# Patient Record
Sex: FEMALE | Race: WHITE | NOT HISPANIC OR LATINO | Employment: OTHER | URBAN - METROPOLITAN AREA
[De-identification: names, ages, dates, MRNs, and addresses within clinical notes are randomized per-mention and may not be internally consistent; named-entity substitution may affect disease eponyms.]

---

## 2023-03-24 DIAGNOSIS — E03.9 ACQUIRED HYPOTHYROIDISM: ICD-10-CM

## 2023-03-24 RX ORDER — LEVOTHYROXINE SODIUM 88 UG/1
88 TABLET ORAL DAILY
COMMUNITY
End: 2023-03-24 | Stop reason: SDUPTHER

## 2023-03-26 RX ORDER — LEVOTHYROXINE SODIUM 88 UG/1
88 TABLET ORAL DAILY
Qty: 90 TABLET | Refills: 1 | Status: SHIPPED | OUTPATIENT
Start: 2023-03-26 | End: 2023-10-09

## 2023-06-29 DIAGNOSIS — I10 ESSENTIAL (PRIMARY) HYPERTENSION: ICD-10-CM

## 2023-06-29 RX ORDER — ISOSORBIDE DINITRATE 10 MG/1
TABLET ORAL
Qty: 90 TABLET | Refills: 1 | Status: SHIPPED | OUTPATIENT
Start: 2023-06-29 | End: 2023-10-30 | Stop reason: SDUPTHER

## 2023-07-29 DIAGNOSIS — I10 ESSENTIAL (PRIMARY) HYPERTENSION: ICD-10-CM

## 2023-07-31 RX ORDER — AMLODIPINE BESYLATE 5 MG/1
5 TABLET ORAL DAILY
Qty: 90 TABLET | Refills: 1 | Status: SHIPPED | OUTPATIENT
Start: 2023-07-31 | End: 2023-10-30 | Stop reason: SDUPTHER

## 2023-09-27 DIAGNOSIS — E03.9 ACQUIRED HYPOTHYROIDISM: ICD-10-CM

## 2023-10-09 RX ORDER — LEVOTHYROXINE SODIUM 88 UG/1
TABLET ORAL
Qty: 30 TABLET | Refills: 0 | Status: SHIPPED | OUTPATIENT
Start: 2023-10-09 | End: 2023-10-27

## 2023-10-09 NOTE — TELEPHONE ENCOUNTER
Dr Giraldo pt    Pt son phoned office and stated pt needs refill on     Levothyroxine    [address]    Pt is sched for apt on 10/30/23

## 2023-10-30 ENCOUNTER — OFFICE VISIT (OUTPATIENT)
Dept: PRIMARY CARE | Facility: CLINIC | Age: 88
End: 2023-10-30
Payer: MEDICARE

## 2023-10-30 VITALS
OXYGEN SATURATION: 98 % | WEIGHT: 137.4 LBS | HEART RATE: 72 BPM | DIASTOLIC BLOOD PRESSURE: 88 MMHG | SYSTOLIC BLOOD PRESSURE: 140 MMHG | BODY MASS INDEX: 25.94 KG/M2 | RESPIRATION RATE: 15 BRPM | HEIGHT: 61 IN

## 2023-10-30 DIAGNOSIS — E03.9 ACQUIRED HYPOTHYROIDISM: ICD-10-CM

## 2023-10-30 DIAGNOSIS — Z71.89 ACP (ADVANCE CARE PLANNING): ICD-10-CM

## 2023-10-30 DIAGNOSIS — Z00.00 ENCOUNTER FOR MEDICARE ANNUAL WELLNESS EXAM: Primary | ICD-10-CM

## 2023-10-30 DIAGNOSIS — I10 ESSENTIAL (PRIMARY) HYPERTENSION: ICD-10-CM

## 2023-10-30 DIAGNOSIS — I10 HTN (HYPERTENSION), BENIGN: ICD-10-CM

## 2023-10-30 DIAGNOSIS — Z00.00 ROUTINE GENERAL MEDICAL EXAMINATION AT HEALTH CARE FACILITY: ICD-10-CM

## 2023-10-30 DIAGNOSIS — Z23 ENCOUNTER FOR IMMUNIZATION: ICD-10-CM

## 2023-10-30 DIAGNOSIS — E55.9 VITAMIN D DEFICIENCY: ICD-10-CM

## 2023-10-30 PROBLEM — K52.9 COLITIS: Status: ACTIVE | Noted: 2023-10-30

## 2023-10-30 PROBLEM — R41.3 MEMORY CHANGES: Status: ACTIVE | Noted: 2023-10-30

## 2023-10-30 PROCEDURE — 90662 IIV NO PRSV INCREASED AG IM: CPT | Performed by: FAMILY MEDICINE

## 2023-10-30 PROCEDURE — 90677 PCV20 VACCINE IM: CPT | Performed by: FAMILY MEDICINE

## 2023-10-30 PROCEDURE — G0439 PPPS, SUBSEQ VISIT: HCPCS | Performed by: FAMILY MEDICINE

## 2023-10-30 PROCEDURE — G0008 ADMIN INFLUENZA VIRUS VAC: HCPCS | Performed by: FAMILY MEDICINE

## 2023-10-30 PROCEDURE — G0009 ADMIN PNEUMOCOCCAL VACCINE: HCPCS | Performed by: FAMILY MEDICINE

## 2023-10-30 PROCEDURE — 99497 ADVNCD CARE PLAN 30 MIN: CPT | Performed by: FAMILY MEDICINE

## 2023-10-30 RX ORDER — AMLODIPINE BESYLATE 5 MG/1
5 TABLET ORAL DAILY
Qty: 90 TABLET | Refills: 1 | Status: SHIPPED | OUTPATIENT
Start: 2023-10-30 | End: 2024-01-02 | Stop reason: DRUGHIGH

## 2023-10-30 RX ORDER — LEVOTHYROXINE SODIUM 88 UG/1
TABLET ORAL
Qty: 90 TABLET | Refills: 0 | Status: SHIPPED | OUTPATIENT
Start: 2023-10-30 | End: 2024-01-25

## 2023-10-30 RX ORDER — MIRTAZAPINE 30 MG/1
30 TABLET, FILM COATED ORAL NIGHTLY
COMMUNITY
End: 2023-12-26

## 2023-10-30 RX ORDER — ISOSORBIDE DINITRATE 10 MG/1
10 TABLET ORAL DAILY
Qty: 90 TABLET | Refills: 1 | Status: SHIPPED | OUTPATIENT
Start: 2023-10-30 | End: 2023-12-26

## 2023-10-30 RX ORDER — OMEPRAZOLE 20 MG/1
TABLET, ORALLY DISINTEGRATING, DELAYED RELEASE ORAL
COMMUNITY
Start: 2022-09-27 | End: 2024-02-08 | Stop reason: SDUPTHER

## 2023-10-30 ASSESSMENT — ENCOUNTER SYMPTOMS
NERVOUS/ANXIOUS: 0
SORE THROAT: 0
RECTAL PAIN: 0
CONFUSION: 0
CONSTIPATION: 0
DIZZINESS: 0
SINUS PRESSURE: 0
BLOOD IN STOOL: 0
DECREASED CONCENTRATION: 0
FATIGUE: 0
COUGH: 0
AGITATION: 0
PALPITATIONS: 0
SLEEP DISTURBANCE: 0
EYE PAIN: 0
DYSURIA: 0
CONSTITUTIONAL NEGATIVE: 1
ADENOPATHY: 0
HEADACHES: 0
ACTIVITY CHANGE: 0
TROUBLE SWALLOWING: 0
COLOR CHANGE: 0
APPETITE CHANGE: 0
NECK STIFFNESS: 0
CHEST TIGHTNESS: 0
SHORTNESS OF BREATH: 0
PHOTOPHOBIA: 0
MYALGIAS: 0
DEPRESSION: 0
ABDOMINAL PAIN: 0
HEMATURIA: 0
SINUS PAIN: 0
LOSS OF SENSATION IN FEET: 0
OCCASIONAL FEELINGS OF UNSTEADINESS: 0
DYSPHORIC MOOD: 0
FEVER: 0
POLYPHAGIA: 0
STRIDOR: 0
SPEECH DIFFICULTY: 0
POLYDIPSIA: 0
FLANK PAIN: 0
EYE ITCHING: 1
DIARRHEA: 0
ABDOMINAL DISTENTION: 0
ARTHRALGIAS: 0
SEIZURES: 0
RHINORRHEA: 0

## 2023-10-30 ASSESSMENT — ACTIVITIES OF DAILY LIVING (ADL)
MANAGING_FINANCES: NEEDS ASSISTANCE
GROCERY_SHOPPING: NEEDS ASSISTANCE
TAKING_MEDICATION: NEEDS ASSISTANCE
DRESSING: INDEPENDENT
DOING_HOUSEWORK: NEEDS ASSISTANCE
BATHING: INDEPENDENT

## 2023-10-30 ASSESSMENT — PATIENT HEALTH QUESTIONNAIRE - PHQ9
2. FEELING DOWN, DEPRESSED OR HOPELESS: NOT AT ALL
1. LITTLE INTEREST OR PLEASURE IN DOING THINGS: NOT AT ALL
SUM OF ALL RESPONSES TO PHQ9 QUESTIONS 1 AND 2: 0

## 2023-10-30 NOTE — PROGRESS NOTES
Subjective   Reason for Visit: Lakeisha Moyer is an 88 y.o. female here for a Medicare Wellness visit.     Past Medical, Surgical, and Family History reviewed and updated in chart.    Reviewed all medications by prescribing practitioner or clinical pharmacist (such as prescriptions, OTCs, herbal therapies and supplements) and documented in the medical record.    Patient is here for medicare annual wellness visit.    Patient would like discuss influenza shot today.    Patient denies have any symptoms or concerns today.        Patient Care Team:  Duglas Giraldo DO as PCP - General  Duglas Giraldo DO as PCP - [facility] ACO Attributed Provider     Review of Systems   Constitutional: Negative.  Negative for activity change, appetite change, fatigue and fever.   HENT:  Negative for congestion, dental problem, ear discharge, ear pain, mouth sores, rhinorrhea, sinus pressure, sinus pain, sore throat, tinnitus and trouble swallowing.    Eyes:  Positive for itching. Negative for photophobia, pain and visual disturbance.   Respiratory:  Negative for cough, chest tightness, shortness of breath and stridor.    Cardiovascular:  Negative for chest pain and palpitations.   Gastrointestinal:  Negative for abdominal distention, abdominal pain, blood in stool, constipation, diarrhea and rectal pain.   Endocrine: Negative for cold intolerance, heat intolerance, polydipsia, polyphagia and polyuria.   Genitourinary:  Negative for dysuria, flank pain, hematuria and urgency.   Musculoskeletal:  Negative for arthralgias, gait problem, myalgias and neck stiffness.   Skin:  Negative for color change and rash.   Allergic/Immunologic: Negative for environmental allergies and food allergies.   Neurological:  Negative for dizziness, seizures, syncope, speech difficulty and headaches.   Hematological:  Negative for adenopathy.   Psychiatric/Behavioral:  Negative for agitation, confusion, decreased concentration, dysphoric mood and  "sleep disturbance. The patient is not nervous/anxious.        Objective   Vitals:  /88 (BP Location: Right arm, Patient Position: Sitting, BP Cuff Size: Adult)   Pulse 72   Resp 15   Ht 1.549 m (5' 1\")   Wt 62.3 kg (137 lb 6.4 oz)   SpO2 98%   BMI 25.96 kg/m²       Physical Exam  Vitals reviewed.   Constitutional:       General: She is not in acute distress.     Appearance: Normal appearance. She is normal weight. She is not ill-appearing or diaphoretic.   HENT:      Head: Normocephalic.      Right Ear: Tympanic membrane and external ear normal.      Left Ear: Tympanic membrane and external ear normal.      Nose: Nose normal. No congestion.      Mouth/Throat:      Pharynx: No posterior oropharyngeal erythema.   Eyes:      General:         Right eye: No discharge.         Left eye: No discharge.      Extraocular Movements: Extraocular movements intact.      Conjunctiva/sclera: Conjunctivae normal.      Pupils: Pupils are equal, round, and reactive to light.   Cardiovascular:      Rate and Rhythm: Normal rate and regular rhythm.      Pulses: Normal pulses.      Heart sounds: Normal heart sounds. No murmur heard.  Pulmonary:      Effort: Pulmonary effort is normal. No respiratory distress.      Breath sounds: Normal breath sounds. No wheezing or rales.   Chest:      Chest wall: No tenderness.   Abdominal:      General: Abdomen is flat. Bowel sounds are normal. There is no distension.      Palpations: There is no mass.      Tenderness: There is no abdominal tenderness. There is no guarding.   Musculoskeletal:         General: No tenderness. Normal range of motion.      Cervical back: Normal range of motion and neck supple. No tenderness.      Right lower leg: No edema.      Left lower leg: No edema.   Skin:     General: Skin is dry.      Coloration: Skin is not jaundiced.      Findings: No bruising, erythema or rash.   Neurological:      General: No focal deficit present.      Mental Status: She is alert and " oriented to person, place, and time. Mental status is at baseline.      Cranial Nerves: No cranial nerve deficit.      Sensory: No sensory deficit.      Coordination: Coordination normal.      Gait: Gait normal.   Psychiatric:         Mood and Affect: Mood normal.         Thought Content: Thought content normal.         Judgment: Judgment normal.         Assessment/Plan   Problem List Items Addressed This Visit       Hypothyroidism    Relevant Medications    levothyroxine (LevoxyL) 88 mcg tablet    HTN (hypertension), benign    Relevant Orders    Comprehensive Metabolic Panel    CBC and Auto Differential    Thyroid Stimulating Hormone     Other Visit Diagnoses       Encounter for Medicare annual wellness exam    -  Primary    Essential (primary) hypertension        Relevant Medications    isosorbide dinitrate (Isordil) 10 mg tablet    amLODIPine (Norvasc) 5 mg tablet    Encounter for immunization        Relevant Orders    Flu vaccine, quadrivalent, high-dose, preservative free, age 65y+ (FLUZONE)    Pneumococcal conjugate vaccine, 20-valent (PREVNAR 20)    ACP (advance care planning)        Vitamin D deficiency        Relevant Orders    Vitamin D 25-Hydroxy,Total (for eval of Vitamin D levels)              Scribe Attestation  By signing my name below, IMadison RMA, Scribe   attest that this documentation has been prepared under the direction and in the presence of Duglas Giraldo DO.   Provider Attestation - Scribe documentation    All medical record entries made by the Scribe were at my direction and personally dictated by me. I have reviewed the chart and agree that the record accurately reflects my personal performance of the history, physical exam, discussion and plan.

## 2023-12-26 DIAGNOSIS — I10 ESSENTIAL (PRIMARY) HYPERTENSION: ICD-10-CM

## 2023-12-26 DIAGNOSIS — R63.0 ANOREXIA: ICD-10-CM

## 2023-12-26 DIAGNOSIS — F43.9 REACTION TO SEVERE STRESS, UNSPECIFIED: ICD-10-CM

## 2023-12-26 RX ORDER — MIRTAZAPINE 30 MG/1
30 TABLET, FILM COATED ORAL NIGHTLY
Qty: 90 TABLET | Refills: 0 | Status: SHIPPED | OUTPATIENT
Start: 2023-12-26 | End: 2024-02-08 | Stop reason: SDUPTHER

## 2023-12-26 RX ORDER — CITALOPRAM 20 MG/1
20 TABLET, FILM COATED ORAL DAILY
Qty: 90 TABLET | Refills: 0 | Status: SHIPPED | OUTPATIENT
Start: 2023-12-26 | End: 2024-02-08 | Stop reason: SDUPTHER

## 2023-12-26 RX ORDER — ISOSORBIDE DINITRATE 10 MG/1
10 TABLET ORAL DAILY
Qty: 90 TABLET | Refills: 0 | Status: SHIPPED | OUTPATIENT
Start: 2023-12-26 | End: 2024-02-08 | Stop reason: SDUPTHER

## 2024-01-02 ENCOUNTER — TELEPHONE (OUTPATIENT)
Dept: PRIMARY CARE | Facility: CLINIC | Age: 89
End: 2024-01-02
Payer: MEDICARE

## 2024-01-02 ENCOUNTER — DOCUMENTATION (OUTPATIENT)
Dept: PRIMARY CARE | Facility: CLINIC | Age: 89
End: 2024-01-02
Payer: MEDICARE

## 2024-01-02 RX ORDER — DONEPEZIL HYDROCHLORIDE 5 MG/1
5 TABLET, FILM COATED ORAL NIGHTLY
COMMUNITY
End: 2024-02-08 | Stop reason: SDUPTHER

## 2024-01-02 RX ORDER — BUSPIRONE HYDROCHLORIDE 5 MG/1
5 TABLET ORAL 2 TIMES DAILY
COMMUNITY
End: 2024-01-04 | Stop reason: ALTCHOICE

## 2024-01-02 RX ORDER — QUETIAPINE FUMARATE 25 MG/1
12.5 TABLET, FILM COATED ORAL NIGHTLY
COMMUNITY
Start: 2023-12-29 | End: 2024-02-08 | Stop reason: SDUPTHER

## 2024-01-02 RX ORDER — AMLODIPINE BESYLATE 10 MG/1
10 TABLET ORAL DAILY
COMMUNITY
Start: 2023-12-29 | End: 2024-01-04 | Stop reason: ALTCHOICE

## 2024-01-02 NOTE — TELEPHONE ENCOUNTER
Son called     Was just discharged from [facility].   Would like to go over new medications and get clarification on what she is to discontinue and what she needs to be taking.  Scheduled hospital FUV today   
Statement Selected

## 2024-01-02 NOTE — PROGRESS NOTES
Discharge Facility: [facility]  Discharge Diagnosis: Intractable vomiting   Admission Date: 12/27/2023  Discharge Date: 12/29/2023    PCP Appointment Date: 1/4/2024  Specialist Appointment Date:   Hospital Encounter and Summary: Linked   See discharge assessment below for further details  Medications  Medications reviewed with patient/caregiver?: Yes (1/2/2024  4:22 PM)  Is the patient having any side effects they believe may be caused by any medication additions or changes?: No (1/2/2024  4:22 PM)  Does the patient have all medications ordered at discharge?: Yes (1/2/2024  4:22 PM)  Care Management Interventions: No intervention needed (1/2/2024  4:22 PM)  Prescription Comments: see med list (seroquel; mirtazapine; amlopidine) (1/2/2024  4:22 PM)  Is the patient taking all medications as directed (includes completed medication regime)?: Yes (1/2/2024  4:22 PM)  Care Management Interventions: Provided patient education (1/2/2024  4:22 PM)    Appointments  Does the patient have a primary care provider?: Yes (1/2/2024  4:22 PM)  Care Management Interventions: Verified appointment date/time/provider (1/2/2024  4:22 PM)  Has the patient kept scheduled appointments due by today?: Yes (1/2/2024  4:22 PM)  Care Management Interventions: Advised patient to keep appointment (1/2/2024  4:22 PM)    Self Management  What is the home health agency?: denies need-family supports (1/2/2024  4:22 PM)    Patient Teaching  Does the patient have access to their discharge instructions?: Yes (1/2/2024  4:22 PM)  Care Management Interventions: Reviewed instructions with patient (1/2/2024  4:22 PM)  What is the patient's perception of their health status since discharge?: Improving (1/2/2024  4:22 PM)  Is the patient/caregiver able to teach back the hierarchy of who to call/visit for symptoms/problems? PCP, Specialist, Home Health nurse, Urgent Care, ED, 911: Yes (1/2/2024  4:22 PM)  Patient/Caregiver Education Comments: Spoke to  patient's son, Keegan, patient states she is doing better but believes the whole incident was caused by her BP going too low. States he does her medicaitons and is her main caregiver but he has help from his daughter who is an RN as well. Medications discussed as well as follow up appts. (1/2/2024  4:22 PM)

## 2024-01-04 ENCOUNTER — OFFICE VISIT (OUTPATIENT)
Dept: PRIMARY CARE | Facility: CLINIC | Age: 89
End: 2024-01-04
Payer: MEDICARE

## 2024-01-04 VITALS
DIASTOLIC BLOOD PRESSURE: 50 MMHG | SYSTOLIC BLOOD PRESSURE: 90 MMHG | WEIGHT: 137 LBS | OXYGEN SATURATION: 96 % | BODY MASS INDEX: 25.86 KG/M2 | TEMPERATURE: 98 F | HEIGHT: 61 IN | HEART RATE: 74 BPM

## 2024-01-04 DIAGNOSIS — I95.89 HYPOTENSION DUE TO HYPOVOLEMIA: Primary | ICD-10-CM

## 2024-01-04 DIAGNOSIS — E03.9 ACQUIRED HYPOTHYROIDISM: ICD-10-CM

## 2024-01-04 DIAGNOSIS — R41.3 MEMORY CHANGES: ICD-10-CM

## 2024-01-04 DIAGNOSIS — F02.B4 MODERATE LATE ONSET ALZHEIMER'S DEMENTIA WITH ANXIETY (MULTI): ICD-10-CM

## 2024-01-04 DIAGNOSIS — E86.1 HYPOTENSION DUE TO HYPOVOLEMIA: Primary | ICD-10-CM

## 2024-01-04 DIAGNOSIS — I10 HTN (HYPERTENSION), BENIGN: ICD-10-CM

## 2024-01-04 DIAGNOSIS — Z09 ENCOUNTER FOR EXAMINATION FOLLOWING TREATMENT AT HOSPITAL: ICD-10-CM

## 2024-01-04 DIAGNOSIS — G30.1 MODERATE LATE ONSET ALZHEIMER'S DEMENTIA WITH ANXIETY (MULTI): ICD-10-CM

## 2024-01-04 DIAGNOSIS — E86.0 DEHYDRATION: ICD-10-CM

## 2024-01-04 PROCEDURE — 99496 TRANSJ CARE MGMT HIGH F2F 7D: CPT | Performed by: FAMILY MEDICINE

## 2024-01-04 ASSESSMENT — ENCOUNTER SYMPTOMS
FLANK PAIN: 0
PALPITATIONS: 0
AGITATION: 0
ADENOPATHY: 0
RECTAL PAIN: 0
POLYPHAGIA: 0
RHINORRHEA: 0
STRIDOR: 0
HEMATURIA: 0
SPEECH DIFFICULTY: 0
ABDOMINAL DISTENTION: 0
ACTIVITY CHANGE: 0
SORE THROAT: 0
POLYDIPSIA: 0
CONSTITUTIONAL NEGATIVE: 1
SLEEP DISTURBANCE: 0
SEIZURES: 0
DIARRHEA: 0
NECK STIFFNESS: 0
DECREASED CONCENTRATION: 0
PHOTOPHOBIA: 0
DIZZINESS: 0
COUGH: 0
CHEST TIGHTNESS: 0
BLOOD IN STOOL: 0
SINUS PAIN: 0
CONSTIPATION: 0
SINUS PRESSURE: 0
NERVOUS/ANXIOUS: 0
TROUBLE SWALLOWING: 0
ARTHRALGIAS: 0
EYE PAIN: 0
COLOR CHANGE: 0
FATIGUE: 0
FEVER: 0
DYSPHORIC MOOD: 0
CONFUSION: 0
APPETITE CHANGE: 0
HEADACHES: 0
SHORTNESS OF BREATH: 0
DYSURIA: 0
ABDOMINAL PAIN: 0
MYALGIAS: 0

## 2024-01-04 NOTE — PATIENT INSTRUCTIONS
Follow up in 3 months    Continue current medications and therapy for chronic medical conditions.    Patient was advised importance of proper diet/nutrition in addition adequate hydration. Patient was encouraged moderate exercise program to include 30 minutes daily for 5 days of the week or 150 minutes weekly. Patient will follow-up with us as scheduled.    STOP AMLODIPINE 10MG

## 2024-01-04 NOTE — PROGRESS NOTES
Subjective   Patient ID: Lakeisha Moyer is a 88 y.o. female who presents for Hospital Follow-up.    Patient was admitted to [facility] on 12/27/2023 for nausea and vomiting. Discharged 12/29/2023.   Patient had CT of abdomen brain and cervical spine with no acute findings.  She was mildly dehydrated and experiencing dementia symptoms.    Blood pressure medications were increased and medications for memory and mood were adjusted.  Amlodipine adjusted to 10 mg daily.  Mirtazapine adjusted to 30 mg 0.5 tablet daily.  Isosorbide was discontinued.  Seroquel 25 mg 0.5 tablet daily was started.           Review of Systems   Constitutional: Negative.  Negative for activity change, appetite change, fatigue and fever.   HENT:  Negative for congestion, dental problem, ear discharge, ear pain, mouth sores, rhinorrhea, sinus pressure, sinus pain, sore throat, tinnitus and trouble swallowing.    Eyes:  Negative for photophobia, pain and visual disturbance.   Respiratory:  Negative for cough, chest tightness, shortness of breath and stridor.    Cardiovascular:  Negative for chest pain and palpitations.   Gastrointestinal:  Negative for abdominal distention, abdominal pain, blood in stool, constipation, diarrhea and rectal pain.   Endocrine: Negative for cold intolerance, heat intolerance, polydipsia, polyphagia and polyuria.   Genitourinary:  Negative for dysuria, flank pain, hematuria and urgency.   Musculoskeletal:  Negative for arthralgias, gait problem, myalgias and neck stiffness.   Skin:  Negative for color change and rash.   Allergic/Immunologic: Negative for environmental allergies and food allergies.   Neurological:  Negative for dizziness, seizures, syncope, speech difficulty and headaches.   Hematological:  Negative for adenopathy.   Psychiatric/Behavioral:  Negative for agitation, confusion, decreased concentration, dysphoric mood and sleep disturbance. The patient is not nervous/anxious.   "      Objective   BP 90/50   Pulse 74   Temp 36.7 °C (98 °F)   Ht 1.549 m (5' 1\")   Wt 62.1 kg (137 lb)   SpO2 96%   BMI 25.89 kg/m²     Physical Exam  Vitals reviewed.   Constitutional:       General: She is not in acute distress.     Appearance: Normal appearance. She is normal weight. She is not ill-appearing or diaphoretic.   HENT:      Head: Normocephalic.      Right Ear: Tympanic membrane and external ear normal.      Left Ear: Tympanic membrane and external ear normal.      Nose: Nose normal. No congestion.      Mouth/Throat:      Pharynx: No posterior oropharyngeal erythema.   Eyes:      General:         Right eye: No discharge.         Left eye: No discharge.      Extraocular Movements: Extraocular movements intact.      Conjunctiva/sclera: Conjunctivae normal.      Pupils: Pupils are equal, round, and reactive to light.   Cardiovascular:      Rate and Rhythm: Normal rate and regular rhythm.      Pulses: Normal pulses.      Heart sounds: Normal heart sounds. No murmur heard.  Pulmonary:      Effort: Pulmonary effort is normal. No respiratory distress.      Breath sounds: Normal breath sounds. No wheezing or rales.   Chest:      Chest wall: No tenderness.   Abdominal:      General: Abdomen is flat. Bowel sounds are normal. There is no distension.      Palpations: There is no mass.      Tenderness: There is no abdominal tenderness. There is no guarding.   Musculoskeletal:         General: No tenderness. Normal range of motion.      Cervical back: Normal range of motion and neck supple. No tenderness.      Right lower leg: No edema.      Left lower leg: No edema.   Skin:     General: Skin is dry.      Coloration: Skin is not jaundiced.      Findings: No bruising, erythema or rash.   Neurological:      General: No focal deficit present.      Mental Status: She is alert. Mental status is at baseline. She is disoriented.      Cranial Nerves: No cranial nerve deficit.      Sensory: No sensory deficit.      " Coordination: Coordination normal.      Gait: Gait normal.      Comments: Disoriented to place and time   Psychiatric:         Mood and Affect: Mood normal.         Assessment/Plan   Problem List Items Addressed This Visit             ICD-10-CM    Hypothyroidism E03.9    HTN (hypertension), benign I10    Relevant Orders    Follow Up In [facility] - Care Manager    Referral to Home Care    Memory changes R41.3    Moderate late onset Alzheimer's dementia with anxiety (CMS/HCC) G30.1, F02.B4     Monitored/progressing         Relevant Orders    Follow Up In [facility] - Care Manager    Referral to Home Care     Other Visit Diagnoses         Codes    Hypotension due to hypovolemia    -  Primary I95.89, E86.1    Encounter for examination following treatment at hospital     Z09    Dehydration     E86.0              Scribe Attestation  By signing my name below, I, MINISTERIO Luis , Regina   attest that this documentation has been prepared under the direction and in the presence of Duglas Giraldo DO.   Provider Attestation - Scribe documentation    All medical record entries made by the Scribe were at my direction and personally dictated by me. I have reviewed the chart and agree that the record accurately reflects my personal performance of the history, physical exam, discussion and plan.     Undermining Location (Optional): in the superficial subcutaneous fat

## 2024-01-05 ENCOUNTER — HOME HEALTH ADMISSION (OUTPATIENT)
Dept: HOME HEALTH SERVICES | Facility: HOME HEALTH | Age: 89
End: 2024-01-05
Payer: MEDICARE

## 2024-01-05 ENCOUNTER — DOCUMENTATION (OUTPATIENT)
Dept: HOME HEALTH SERVICES | Facility: HOME HEALTH | Age: 89
End: 2024-01-05
Payer: MEDICARE

## 2024-01-05 NOTE — HH CARE COORDINATION
Home Care received a Referral for Nursing, Physical Therapy, and Occupational Therapy. We have processed the referral for a Start of Care on 1/6/24 1-2 days.     If you have any questions or concerns, please feel free to contact us at [phone]. Follow the prompts, enter your five digit zip code, and you will be directed to your care team on [facility] 2.

## 2024-01-08 ENCOUNTER — HOME CARE VISIT (OUTPATIENT)
Dept: HOME HEALTH SERVICES | Facility: HOME HEALTH | Age: 89
End: 2024-01-08
Payer: MEDICARE

## 2024-01-08 VITALS
HEART RATE: 74 BPM | TEMPERATURE: 98.7 F | SYSTOLIC BLOOD PRESSURE: 154 MMHG | RESPIRATION RATE: 18 BRPM | DIASTOLIC BLOOD PRESSURE: 78 MMHG

## 2024-01-08 PROCEDURE — 1090000002 HH PPS REVENUE DEBIT

## 2024-01-08 PROCEDURE — 1090000001 HH PPS REVENUE CREDIT

## 2024-01-08 PROCEDURE — 169592 NO-PAY CLAIM PROCEDURE

## 2024-01-08 PROCEDURE — 0023 HH SOC

## 2024-01-08 PROCEDURE — G0299 HHS/HOSPICE OF RN EA 15 MIN: HCPCS | Mod: HHH

## 2024-01-08 ASSESSMENT — ENCOUNTER SYMPTOMS
DENIES PAIN: 1
PERSON REPORTING PAIN: PATIENT

## 2024-01-09 PROCEDURE — 1090000002 HH PPS REVENUE DEBIT

## 2024-01-09 PROCEDURE — 1090000001 HH PPS REVENUE CREDIT

## 2024-01-09 ASSESSMENT — ENCOUNTER SYMPTOMS
APPETITE LEVEL: GOOD
DESCRIPTION OF MEMORY LOSS: SHORT TERM

## 2024-01-09 ASSESSMENT — ACTIVITIES OF DAILY LIVING (ADL)
ENTERING_EXITING_HOME: NEEDS ASSISTANCE
OASIS_M1830: 03

## 2024-01-10 ENCOUNTER — PATIENT OUTREACH (OUTPATIENT)
Dept: PRIMARY CARE | Facility: CLINIC | Age: 89
End: 2024-01-10
Payer: MEDICARE

## 2024-01-10 ENCOUNTER — HOME CARE VISIT (OUTPATIENT)
Dept: HOME HEALTH SERVICES | Facility: HOME HEALTH | Age: 89
End: 2024-01-10
Payer: MEDICARE

## 2024-01-10 DIAGNOSIS — G30.1 MILD LATE ONSET ALZHEIMER'S DEMENTIA WITH AGITATION (MULTI): Primary | ICD-10-CM

## 2024-01-10 DIAGNOSIS — F02.A11 MILD LATE ONSET ALZHEIMER'S DEMENTIA WITH AGITATION (MULTI): Primary | ICD-10-CM

## 2024-01-10 DIAGNOSIS — I10 HTN (HYPERTENSION), BENIGN: Primary | ICD-10-CM

## 2024-01-10 PROCEDURE — 1090000001 HH PPS REVENUE CREDIT

## 2024-01-10 PROCEDURE — 1090000002 HH PPS REVENUE DEBIT

## 2024-01-10 NOTE — PROGRESS NOTES
Call regarding appt. with PCP on (1/4/2024) after hospitalization.  At time of outreach call the patient feels as if their condition has (improved) since last visit. Patient's son Keegan, states he is still giving the patient her Isosorbide dinitrate as her Bps have been running too high. States today BP was 175/91 so he gave the BP med and it came down 156/76 an hour later. Wanting to know if he needs to restart the amlodipine or not though. Educated Keegan on when BP is dangerously high and steps to take if this happens.   Reviewed the PCP appointment with the pt and addressed any questions or concerns.

## 2024-01-11 PROCEDURE — 1090000001 HH PPS REVENUE CREDIT

## 2024-01-11 PROCEDURE — 1090000002 HH PPS REVENUE DEBIT

## 2024-01-11 RX ORDER — AMLODIPINE BESYLATE 5 MG/1
5 TABLET ORAL DAILY
Qty: 30 TABLET | Refills: 1 | Status: SHIPPED | OUTPATIENT
Start: 2024-01-11 | End: 2024-02-08 | Stop reason: SDUPTHER

## 2024-01-12 ENCOUNTER — TELEPHONE (OUTPATIENT)
Dept: PRIMARY CARE | Facility: CLINIC | Age: 89
End: 2024-01-12
Payer: MEDICARE

## 2024-01-12 ENCOUNTER — PATIENT OUTREACH (OUTPATIENT)
Dept: PRIMARY CARE | Facility: CLINIC | Age: 89
End: 2024-01-12
Payer: MEDICARE

## 2024-01-12 PROCEDURE — 1090000001 HH PPS REVENUE CREDIT

## 2024-01-12 PROCEDURE — 1090000002 HH PPS REVENUE DEBIT

## 2024-01-12 NOTE — PROGRESS NOTES
Introduced patient to Chronic Care Management Program to son Cameron.  He declined enrollment at this time.  My contact info was provided should they reconsider.    Doing better since hospital discharge  Dementia is main issue but doing well  Very supportive family  Gaining weight in the past year - 17 pounds

## 2024-01-12 NOTE — TELEPHONE ENCOUNTER
STEPHANIE patient     Called and left voicemail for patient's son. Please inform patient that Dr. Giraldo will be resuming patient's Amlodipine.

## 2024-01-13 PROCEDURE — 1090000001 HH PPS REVENUE CREDIT

## 2024-01-13 PROCEDURE — 1090000002 HH PPS REVENUE DEBIT

## 2024-01-14 PROCEDURE — 1090000001 HH PPS REVENUE CREDIT

## 2024-01-14 PROCEDURE — 1090000002 HH PPS REVENUE DEBIT

## 2024-01-15 ENCOUNTER — HOME CARE VISIT (OUTPATIENT)
Dept: HOME HEALTH SERVICES | Facility: HOME HEALTH | Age: 89
End: 2024-01-15
Payer: MEDICARE

## 2024-01-15 VITALS
HEART RATE: 76 BPM | TEMPERATURE: 98.1 F | RESPIRATION RATE: 18 BRPM | DIASTOLIC BLOOD PRESSURE: 76 MMHG | OXYGEN SATURATION: 97 % | SYSTOLIC BLOOD PRESSURE: 140 MMHG

## 2024-01-15 PROCEDURE — 1090000001 HH PPS REVENUE CREDIT

## 2024-01-15 PROCEDURE — G0299 HHS/HOSPICE OF RN EA 15 MIN: HCPCS | Mod: HHH

## 2024-01-15 PROCEDURE — 1090000002 HH PPS REVENUE DEBIT

## 2024-01-16 PROCEDURE — G0180 MD CERTIFICATION HHA PATIENT: HCPCS | Performed by: FAMILY MEDICINE

## 2024-01-16 PROCEDURE — 1090000002 HH PPS REVENUE DEBIT

## 2024-01-16 PROCEDURE — 1090000001 HH PPS REVENUE CREDIT

## 2024-01-16 ASSESSMENT — ENCOUNTER SYMPTOMS
LOWER EXTREMITY EDEMA: 1
APPETITE LEVEL: GOOD
PERSON REPORTING PAIN: PATIENT
DENIES PAIN: 1

## 2024-01-17 PROCEDURE — 1090000002 HH PPS REVENUE DEBIT

## 2024-01-17 PROCEDURE — 1090000001 HH PPS REVENUE CREDIT

## 2024-01-18 PROCEDURE — 1090000002 HH PPS REVENUE DEBIT

## 2024-01-18 PROCEDURE — 1090000001 HH PPS REVENUE CREDIT

## 2024-01-19 PROCEDURE — 1090000001 HH PPS REVENUE CREDIT

## 2024-01-19 PROCEDURE — 1090000002 HH PPS REVENUE DEBIT

## 2024-01-20 PROCEDURE — 1090000001 HH PPS REVENUE CREDIT

## 2024-01-20 PROCEDURE — 1090000002 HH PPS REVENUE DEBIT

## 2024-01-21 PROCEDURE — 1090000002 HH PPS REVENUE DEBIT

## 2024-01-21 PROCEDURE — 1090000001 HH PPS REVENUE CREDIT

## 2024-01-22 ENCOUNTER — HOME CARE VISIT (OUTPATIENT)
Dept: HOME HEALTH SERVICES | Facility: HOME HEALTH | Age: 89
End: 2024-01-22
Payer: MEDICARE

## 2024-01-22 VITALS
OXYGEN SATURATION: 97 % | HEART RATE: 72 BPM | TEMPERATURE: 97.2 F | DIASTOLIC BLOOD PRESSURE: 72 MMHG | SYSTOLIC BLOOD PRESSURE: 128 MMHG

## 2024-01-22 PROCEDURE — 1090000001 HH PPS REVENUE CREDIT

## 2024-01-22 PROCEDURE — 1090000002 HH PPS REVENUE DEBIT

## 2024-01-22 PROCEDURE — G0299 HHS/HOSPICE OF RN EA 15 MIN: HCPCS | Mod: HHH

## 2024-01-23 PROCEDURE — 1090000001 HH PPS REVENUE CREDIT

## 2024-01-23 PROCEDURE — 1090000002 HH PPS REVENUE DEBIT

## 2024-01-24 PROCEDURE — 1090000002 HH PPS REVENUE DEBIT

## 2024-01-24 PROCEDURE — 1090000001 HH PPS REVENUE CREDIT

## 2024-01-24 ASSESSMENT — ENCOUNTER SYMPTOMS
APPETITE LEVEL: GOOD
LOWER EXTREMITY EDEMA: 1
DESCRIPTION OF MEMORY LOSS: SHORT TERM
PERSON REPORTING PAIN: PATIENT
DENIES PAIN: 1

## 2024-01-25 DIAGNOSIS — E03.9 ACQUIRED HYPOTHYROIDISM: ICD-10-CM

## 2024-01-25 PROCEDURE — 1090000001 HH PPS REVENUE CREDIT

## 2024-01-25 PROCEDURE — 1090000002 HH PPS REVENUE DEBIT

## 2024-01-25 RX ORDER — LEVOTHYROXINE SODIUM 88 UG/1
TABLET ORAL
Qty: 90 TABLET | Refills: 0 | Status: SHIPPED | OUTPATIENT
Start: 2024-01-25 | End: 2024-02-08 | Stop reason: SDUPTHER

## 2024-01-26 PROCEDURE — 1090000001 HH PPS REVENUE CREDIT

## 2024-01-26 PROCEDURE — 1090000002 HH PPS REVENUE DEBIT

## 2024-01-27 PROCEDURE — 1090000001 HH PPS REVENUE CREDIT

## 2024-01-27 PROCEDURE — 1090000002 HH PPS REVENUE DEBIT

## 2024-01-28 PROCEDURE — 1090000002 HH PPS REVENUE DEBIT

## 2024-01-28 PROCEDURE — 1090000001 HH PPS REVENUE CREDIT

## 2024-01-29 PROCEDURE — 1090000001 HH PPS REVENUE CREDIT

## 2024-01-29 PROCEDURE — 1090000002 HH PPS REVENUE DEBIT

## 2024-01-30 PROCEDURE — 1090000002 HH PPS REVENUE DEBIT

## 2024-01-30 PROCEDURE — 1090000001 HH PPS REVENUE CREDIT

## 2024-01-31 ENCOUNTER — HOME CARE VISIT (OUTPATIENT)
Dept: HOME HEALTH SERVICES | Facility: HOME HEALTH | Age: 89
End: 2024-01-31
Payer: MEDICARE

## 2024-01-31 VITALS
RESPIRATION RATE: 18 BRPM | HEART RATE: 70 BPM | TEMPERATURE: 97.3 F | SYSTOLIC BLOOD PRESSURE: 120 MMHG | DIASTOLIC BLOOD PRESSURE: 64 MMHG | OXYGEN SATURATION: 98 %

## 2024-01-31 PROCEDURE — 1090000003 HH PPS REVENUE ADJ

## 2024-01-31 PROCEDURE — G0299 HHS/HOSPICE OF RN EA 15 MIN: HCPCS | Mod: HHH

## 2024-01-31 PROCEDURE — 1090000002 HH PPS REVENUE DEBIT

## 2024-01-31 PROCEDURE — 1090000001 HH PPS REVENUE CREDIT

## 2024-01-31 ASSESSMENT — ENCOUNTER SYMPTOMS
PERSON REPORTING PAIN: PATIENT
DENIES PAIN: 1

## 2024-01-31 ASSESSMENT — ACTIVITIES OF DAILY LIVING (ADL)
HOME_HEALTH_OASIS: 01
OASIS_M1830: 01

## 2024-02-08 DIAGNOSIS — F02.B4 MODERATE LATE ONSET ALZHEIMER'S DEMENTIA WITH ANXIETY (MULTI): ICD-10-CM

## 2024-02-08 DIAGNOSIS — K52.9 COLITIS: ICD-10-CM

## 2024-02-08 DIAGNOSIS — I10 HTN (HYPERTENSION), BENIGN: ICD-10-CM

## 2024-02-08 DIAGNOSIS — G30.1 MODERATE LATE ONSET ALZHEIMER'S DEMENTIA WITH ANXIETY (MULTI): ICD-10-CM

## 2024-02-08 DIAGNOSIS — F43.9 REACTION TO SEVERE STRESS, UNSPECIFIED: ICD-10-CM

## 2024-02-08 DIAGNOSIS — R41.3 MEMORY CHANGES: ICD-10-CM

## 2024-02-08 DIAGNOSIS — I10 ESSENTIAL (PRIMARY) HYPERTENSION: ICD-10-CM

## 2024-02-08 DIAGNOSIS — R63.0 ANOREXIA: ICD-10-CM

## 2024-02-08 DIAGNOSIS — E03.9 ACQUIRED HYPOTHYROIDISM: ICD-10-CM

## 2024-02-09 RX ORDER — LEVOTHYROXINE SODIUM 88 UG/1
88 TABLET ORAL
Qty: 90 TABLET | Refills: 1 | Status: SHIPPED | OUTPATIENT
Start: 2024-02-09

## 2024-02-09 RX ORDER — AMLODIPINE BESYLATE 5 MG/1
5 TABLET ORAL DAILY
Qty: 90 TABLET | Refills: 1 | Status: SHIPPED | OUTPATIENT
Start: 2024-02-09 | End: 2024-02-26

## 2024-02-09 RX ORDER — CITALOPRAM 20 MG/1
20 TABLET, FILM COATED ORAL DAILY
Qty: 90 TABLET | Refills: 1 | Status: SHIPPED | OUTPATIENT
Start: 2024-02-09

## 2024-02-09 RX ORDER — ISOSORBIDE DINITRATE 10 MG/1
10 TABLET ORAL DAILY
Qty: 90 TABLET | Refills: 1 | Status: SHIPPED | OUTPATIENT
Start: 2024-02-09

## 2024-02-09 RX ORDER — MIRTAZAPINE 30 MG/1
30 TABLET, FILM COATED ORAL NIGHTLY
Qty: 90 TABLET | Refills: 1 | Status: SHIPPED | OUTPATIENT
Start: 2024-02-09

## 2024-02-09 RX ORDER — OMEPRAZOLE 20 MG/1
20 TABLET, ORALLY DISINTEGRATING, DELAYED RELEASE ORAL DAILY
Qty: 90 TABLET | Refills: 1 | Status: SHIPPED | OUTPATIENT
Start: 2024-02-09

## 2024-02-09 RX ORDER — DONEPEZIL HYDROCHLORIDE 5 MG/1
5 TABLET, FILM COATED ORAL NIGHTLY
Qty: 90 TABLET | Refills: 1 | Status: SHIPPED | OUTPATIENT
Start: 2024-02-09

## 2024-02-09 RX ORDER — QUETIAPINE FUMARATE 25 MG/1
12.5 TABLET, FILM COATED ORAL NIGHTLY
Qty: 45 TABLET | Refills: 1 | Status: SHIPPED | OUTPATIENT
Start: 2024-02-09

## 2024-02-23 DIAGNOSIS — I10 HTN (HYPERTENSION), BENIGN: ICD-10-CM

## 2024-02-26 RX ORDER — AMLODIPINE BESYLATE 5 MG/1
5 TABLET ORAL DAILY
Qty: 90 TABLET | Refills: 1 | Status: SHIPPED | OUTPATIENT
Start: 2024-02-26

## 2024-02-29 ENCOUNTER — PATIENT OUTREACH (OUTPATIENT)
Dept: PRIMARY CARE | Facility: CLINIC | Age: 89
End: 2024-02-29
Payer: MEDICARE

## 2024-02-29 NOTE — PROGRESS NOTES
"Call placed regarding two month post discharge follow up call.  At time of outreach call the patient feels as if their condition has (returned to baseline) since initial visit with PCP or specialist. Spoke with Keegan, patient's son, and she states patient is doing \"really well\" and that all the medications are currently working well for her at this point in time.   Questions or concerns regarding recovery period addressed at this time.   "

## 2024-09-14 DIAGNOSIS — F43.9 REACTION TO SEVERE STRESS, UNSPECIFIED: ICD-10-CM

## 2024-09-14 DIAGNOSIS — I10 HTN (HYPERTENSION), BENIGN: ICD-10-CM

## 2024-09-16 RX ORDER — AMLODIPINE BESYLATE 5 MG/1
5 TABLET ORAL DAILY
Qty: 30 TABLET | Refills: 0 | Status: SHIPPED | OUTPATIENT
Start: 2024-09-16

## 2024-09-16 RX ORDER — CITALOPRAM 20 MG/1
20 TABLET, FILM COATED ORAL DAILY
Qty: 30 TABLET | Refills: 0 | Status: SHIPPED | OUTPATIENT
Start: 2024-09-16

## 2024-10-14 DIAGNOSIS — R63.0 ANOREXIA: ICD-10-CM

## 2024-10-24 DIAGNOSIS — F43.9 REACTION TO SEVERE STRESS, UNSPECIFIED: ICD-10-CM

## 2024-10-24 DIAGNOSIS — I10 HTN (HYPERTENSION), BENIGN: ICD-10-CM

## 2024-10-24 RX ORDER — AMLODIPINE BESYLATE 5 MG/1
5 TABLET ORAL DAILY
Qty: 30 TABLET | Refills: 0 | Status: SHIPPED | OUTPATIENT
Start: 2024-10-24

## 2024-10-24 RX ORDER — CITALOPRAM 20 MG/1
20 TABLET, FILM COATED ORAL DAILY
Qty: 30 TABLET | Refills: 0 | Status: SHIPPED | OUTPATIENT
Start: 2024-10-24

## 2024-11-13 DIAGNOSIS — I10 ESSENTIAL (PRIMARY) HYPERTENSION: ICD-10-CM

## 2024-11-13 RX ORDER — ISOSORBIDE DINITRATE 10 MG/1
10 TABLET ORAL DAILY
Qty: 30 TABLET | Refills: 0 | Status: SHIPPED | OUTPATIENT
Start: 2024-11-13

## 2024-11-23 DIAGNOSIS — I10 HTN (HYPERTENSION), BENIGN: ICD-10-CM

## 2024-11-23 DIAGNOSIS — F43.9 REACTION TO SEVERE STRESS, UNSPECIFIED: ICD-10-CM

## 2024-11-25 RX ORDER — AMLODIPINE BESYLATE 5 MG/1
5 TABLET ORAL DAILY
Qty: 30 TABLET | Refills: 0 | OUTPATIENT
Start: 2024-11-25

## 2024-11-25 RX ORDER — CITALOPRAM 20 MG/1
20 TABLET, FILM COATED ORAL DAILY
Qty: 30 TABLET | Refills: 0 | OUTPATIENT
Start: 2024-11-25

## 2024-11-25 NOTE — TELEPHONE ENCOUNTER
Recent Visits  Date Type Provider Dept   01/04/24 Office Visit Duglas Giraldo DO Do [facility]   Showing recent visits within past 365 days and meeting all other requirements  Future Appointments  No visits were found meeting these conditions.  Showing future appointments within next 90 days and meeting all other requirements

## 2024-11-29 RX ORDER — AMLODIPINE BESYLATE 5 MG/1
5 TABLET ORAL DAILY
Qty: 30 TABLET | Refills: 1 | Status: SHIPPED | OUTPATIENT
Start: 2024-11-29

## 2024-11-29 RX ORDER — CITALOPRAM 20 MG/1
20 TABLET, FILM COATED ORAL DAILY
Qty: 30 TABLET | Refills: 1 | Status: SHIPPED | OUTPATIENT
Start: 2024-11-29

## 2024-11-29 NOTE — TELEPHONE ENCOUNTER
Recent Visits  No visits were found meeting these conditions.  Showing recent visits within past 180 days and meeting all other requirements  Future Appointments  Date Type Provider Dept   01/10/25 Appointment Duglas Giraldo DO Do [facility]   Showing future appointments within next 90 days and meeting all other requirements

## 2024-12-04 DIAGNOSIS — I10 ESSENTIAL (PRIMARY) HYPERTENSION: ICD-10-CM

## 2024-12-04 RX ORDER — ISOSORBIDE DINITRATE 10 MG/1
10 TABLET ORAL DAILY
Qty: 30 TABLET | Refills: 0 | Status: SHIPPED | OUTPATIENT
Start: 2024-12-04

## 2024-12-11 ENCOUNTER — PATIENT OUTREACH (OUTPATIENT)
Dept: PRIMARY CARE | Facility: CLINIC | Age: 89
End: 2024-12-11
Payer: MEDICARE

## 2024-12-11 NOTE — PROGRESS NOTES
Discharge Facility: [facility]   Discharge Diagnosis: Syncope and collapse   Admission Date: 12/6/24  Discharge Date: 12/9/24    PCP Appointment Date:  Duglas Giraldo DO 12/17/24  Specialist Appointment Date:  D  Hospital Encounter and Summary Linked: Yes  See discharge assessment below for further details     Engagement  Call Start Time: 1105 (This CM spoke with pts son) (12/11/2024 11:05 AM)    Medications  Medications reviewed with patient/caregiver?: No (12/11/2024 11:05 AM)  Is the patient having any side effects they believe may be caused by any medication additions or changes?: No (12/11/2024 11:05 AM)  Does the patient have all medications ordered at discharge?: Yes (12/11/2024 11:05 AM)  Care Management Interventions: No intervention needed (12/11/2024 11:05 AM)  Prescription Comments: HOLD metoprolol, amlodipine 5mg daily dose  Rec'd to take BP qHS prior to metop and hold if SBP <140, to be further adjusted per PCP on F/U prn (12/11/2024 11:05 AM)  Is the patient taking all medications as directed (includes completed medication regime)?: Yes (12/11/2024 11:05 AM)  Medication Comments: no issues obtaining medications (12/11/2024 11:05 AM)    Appointments  Does the patient have a primary care provider?: Yes (12/11/2024 11:05 AM)  Care Management Interventions: Verified appointment date/time/provider (12/11/2024 11:05 AM)  Has the patient kept scheduled appointments due by today?: Yes (12/11/2024 11:05 AM)  Care Management Interventions: Advised patient to keep appointment (12/11/2024 11:05 AM)    Self Management  What is the home health agency?: Home Health Care (12/11/2024 11:05 AM)  What Durable Medical Equipment (DME) was ordered?: no new needs (12/11/2024 11:05 AM)    Patient Teaching  Does the patient have access to their discharge instructions?: Yes (12/11/2024 11:05 AM)  Care Management Interventions: Reviewed instructions with patient (12/11/2024 11:05 AM)  What is the patient's  perception of their health status since discharge?: Same (12/11/2024 11:05 AM)  Is the patient/caregiver able to teach back the hierarchy of who to call/visit for symptoms/problems? PCP, Specialist, Home Health nurse, Urgent Care, ED, 911: Yes (12/11/2024 11:05 AM)  Patient/Caregiver Education Comments: CM discussed referencing discharge paperwork to follow detailed daily medication schedule (12/11/2024 11:05 AM)    Wrap Up  Wrap Up Additional Comments: This CM spoke with pts son via phone. Pts son reports pt doing well at home since discharge. Son reports he is trying to get Macey to drink water throughout the day. Pt son is  aware of my availability for non-emergent concerns. Contact info provided. (12/11/2024 11:05 AM)

## 2024-12-13 DIAGNOSIS — E03.9 ACQUIRED HYPOTHYROIDISM: ICD-10-CM

## 2024-12-13 RX ORDER — LEVOTHYROXINE SODIUM 88 UG/1
88 TABLET ORAL
Qty: 30 TABLET | Refills: 0 | OUTPATIENT
Start: 2024-12-13

## 2024-12-13 NOTE — TELEPHONE ENCOUNTER
Recent Visits  Date Type Provider Dept   01/04/24 Office Visit Duglas Giraldo, DO Do [facility]   Showing recent visits within past 365 days and meeting all other requirements  Future Appointments  Date Type Provider Dept   12/17/24 Appointment Duglas Giraldo, DO Do [facility]   01/10/25 Appointment Duglas Giraldo,  Do [facility]   Showing future appointments within next 90 days and meeting all other requirements

## 2024-12-16 NOTE — TELEPHONE ENCOUNTER
Patient son called [facility] ()  She had an appointment for hospital follow up with Dr. Giraldo tomorrow 12/17/2024    He can't get her in the office because its hard to get her in car so switch in office to virtual visit tommorow with dr. Giraldo    She is not eating or drinking and when she is walking she is very dizzy.    He wants to know if there is an order that can be put in for home health care to help with his mom

## 2024-12-17 ENCOUNTER — HOME HEALTH ADMISSION (OUTPATIENT)
Dept: HOME HEALTH SERVICES | Facility: HOME HEALTH | Age: 89
End: 2024-12-17
Payer: MEDICARE

## 2024-12-17 ENCOUNTER — APPOINTMENT (OUTPATIENT)
Dept: PRIMARY CARE | Facility: CLINIC | Age: 89
End: 2024-12-17
Payer: MEDICARE

## 2024-12-17 VITALS
BODY MASS INDEX: 23.68 KG/M2 | SYSTOLIC BLOOD PRESSURE: 147 MMHG | HEIGHT: 61 IN | DIASTOLIC BLOOD PRESSURE: 76 MMHG | WEIGHT: 125.4 LBS

## 2024-12-17 DIAGNOSIS — F43.9 REACTION TO SEVERE STRESS, UNSPECIFIED: ICD-10-CM

## 2024-12-17 DIAGNOSIS — R62.7 FAILURE TO THRIVE IN ADULT: ICD-10-CM

## 2024-12-17 DIAGNOSIS — I10 HTN (HYPERTENSION), BENIGN: ICD-10-CM

## 2024-12-17 DIAGNOSIS — R55 SYNCOPE AND COLLAPSE: Primary | ICD-10-CM

## 2024-12-17 DIAGNOSIS — E03.9 ACQUIRED HYPOTHYROIDISM: ICD-10-CM

## 2024-12-17 DIAGNOSIS — I10 ESSENTIAL (PRIMARY) HYPERTENSION: ICD-10-CM

## 2024-12-17 DIAGNOSIS — I72.0 ANEURYSM OF CAROTID ARTERY (CMS-HCC): ICD-10-CM

## 2024-12-17 DIAGNOSIS — R63.0 ANOREXIA: ICD-10-CM

## 2024-12-17 PROCEDURE — 99495 TRANSJ CARE MGMT MOD F2F 14D: CPT | Performed by: FAMILY MEDICINE

## 2024-12-17 RX ORDER — QUETIAPINE FUMARATE 25 MG/1
12.5 TABLET, FILM COATED ORAL NIGHTLY
Qty: 45 TABLET | Refills: 1 | Status: SHIPPED | OUTPATIENT
Start: 2024-12-17

## 2024-12-17 RX ORDER — AMLODIPINE BESYLATE 5 MG/1
5 TABLET ORAL DAILY
Qty: 90 TABLET | Refills: 1 | Status: SHIPPED | OUTPATIENT
Start: 2024-12-17

## 2024-12-17 RX ORDER — LEVOTHYROXINE SODIUM 88 UG/1
88 TABLET ORAL
Qty: 90 TABLET | Refills: 1 | Status: SHIPPED | OUTPATIENT
Start: 2024-12-17

## 2024-12-17 RX ORDER — MIRTAZAPINE 30 MG/1
30 TABLET, FILM COATED ORAL NIGHTLY
Qty: 90 TABLET | Refills: 1 | Status: SHIPPED | OUTPATIENT
Start: 2024-12-17

## 2024-12-17 RX ORDER — ISOSORBIDE DINITRATE 10 MG/1
10 TABLET ORAL DAILY
Qty: 90 TABLET | Refills: 1 | Status: SHIPPED | OUTPATIENT
Start: 2024-12-17

## 2024-12-17 RX ORDER — MIRTAZAPINE 30 MG/1
30 TABLET, FILM COATED ORAL NIGHTLY
Qty: 90 TABLET | Refills: 1 | OUTPATIENT
Start: 2024-12-17

## 2024-12-17 RX ORDER — CITALOPRAM 20 MG/1
20 TABLET, FILM COATED ORAL DAILY
Qty: 90 TABLET | Refills: 1 | Status: SHIPPED | OUTPATIENT
Start: 2024-12-17

## 2024-12-17 ASSESSMENT — ENCOUNTER SYMPTOMS
COUGH: 0
ABDOMINAL PAIN: 0
CONSTIPATION: 0
DYSPHORIC MOOD: 0
FEVER: 0
ADENOPATHY: 0
SEIZURES: 0
TROUBLE SWALLOWING: 0
SPEECH DIFFICULTY: 0
APPETITE CHANGE: 1
PHOTOPHOBIA: 0
OCCASIONAL FEELINGS OF UNSTEADINESS: 1
DECREASED CONCENTRATION: 0
BLOOD IN STOOL: 0
DIZZINESS: 0
FATIGUE: 0
EYE PAIN: 0
HEADACHES: 0
SLEEP DISTURBANCE: 0
CONFUSION: 0
LOSS OF SENSATION IN FEET: 0
ACTIVITY CHANGE: 0
SINUS PRESSURE: 0
DEPRESSION: 0
POLYDIPSIA: 0
SINUS PAIN: 0
NECK STIFFNESS: 0
AGITATION: 0
STRIDOR: 0
NERVOUS/ANXIOUS: 0
MYALGIAS: 0
SHORTNESS OF BREATH: 0
DIARRHEA: 0
SORE THROAT: 0
FLANK PAIN: 0
RECTAL PAIN: 0
RHINORRHEA: 0
DYSURIA: 0
COLOR CHANGE: 0
ABDOMINAL DISTENTION: 0
POLYPHAGIA: 0
UNEXPECTED WEIGHT CHANGE: 1
ARTHRALGIAS: 0
CHEST TIGHTNESS: 0
HEMATURIA: 0
PALPITATIONS: 0

## 2024-12-17 ASSESSMENT — PATIENT HEALTH QUESTIONNAIRE - PHQ9
1. LITTLE INTEREST OR PLEASURE IN DOING THINGS: SEVERAL DAYS
2. FEELING DOWN, DEPRESSED OR HOPELESS: SEVERAL DAYS
10. IF YOU CHECKED OFF ANY PROBLEMS, HOW DIFFICULT HAVE THESE PROBLEMS MADE IT FOR YOU TO DO YOUR WORK, TAKE CARE OF THINGS AT HOME, OR GET ALONG WITH OTHER PEOPLE: SOMEWHAT DIFFICULT
SUM OF ALL RESPONSES TO PHQ9 QUESTIONS 1 AND 2: 2

## 2024-12-17 NOTE — PROGRESS NOTES
Subjective   Patient ID: Macey Moyer is a 89 y.o. female who presents for Hospital Follow-up.    Consent obtained by Macey Moyer for audio and visual communication. Total time for this audio and visual communication visit is 12 minutes.     Patient presents today to follow up from recent hospitalization  Patient was admitted to [facility] on 12/06/2024-12/09/2024  Syncope and Collapse   Patient had labs, CT scans, and echocardiogram completed     Son has concern with patient having a decrease in appetite and decrease fluid intake. States she will only eat a few bites and sips. Down 12lbs since January. Son states she has been without mirtazapine    Patient was stopped on Metoprolol 50mg. Son states she has not been taking this one for a while now.     Son would like to discuss patient's medication. States she has not been taking Donepezil or Mirtazapine        Review of Systems   Constitutional:  Positive for appetite change and unexpected weight change. Negative for activity change, fatigue and fever.   HENT:  Negative for congestion, dental problem, ear discharge, ear pain, mouth sores, rhinorrhea, sinus pressure, sinus pain, sore throat, tinnitus and trouble swallowing.    Eyes:  Negative for photophobia, pain and visual disturbance.   Respiratory:  Negative for cough, chest tightness, shortness of breath and stridor.    Cardiovascular:  Negative for chest pain and palpitations.   Gastrointestinal:  Negative for abdominal distention, abdominal pain, blood in stool, constipation, diarrhea and rectal pain.   Endocrine: Negative for cold intolerance, heat intolerance, polydipsia, polyphagia and polyuria.   Genitourinary:  Negative for dysuria, flank pain, hematuria and urgency.   Musculoskeletal:  Negative for arthralgias, gait problem, myalgias and neck stiffness.   Skin:  Negative for color change and rash.   Allergic/Immunologic: Negative for environmental allergies and food allergies.  "  Neurological:  Negative for dizziness, seizures, syncope, speech difficulty and headaches.   Hematological:  Negative for adenopathy.   Psychiatric/Behavioral:  Negative for agitation, confusion, decreased concentration, dysphoric mood and sleep disturbance. The patient is not nervous/anxious.        Objective   /76   Ht 1.549 m (5' 1\")   Wt 56.9 kg (125 lb 6.4 oz)   BMI 23.69 kg/m²     Physical Exam  HENT:      Ears:      Comments: Hearing impairment  Neurological:      Mental Status: She is alert. She is disoriented.   Psychiatric:         Mood and Affect: Mood normal.         Thought Content: Thought content normal.       Constitutional: Well developed, well nourished, alert and in no acute distress   Psychiatric: Mood calm and affect normal    Virtual Visit - Audio and Visual Communication Real Time      Assessment/Plan   Problem List Items Addressed This Visit             ICD-10-CM    Hypothyroidism E03.9    Relevant Medications    levothyroxine (Synthroid, Levoxyl) 88 mcg tablet    HTN (hypertension), benign I10    Relevant Medications    amLODIPine (Norvasc) 5 mg tablet     Other Visit Diagnoses         Codes    Syncope and collapse    -  Primary R55    Reaction to severe stress, unspecified     F43.9    Relevant Medications    citalopram (CeleXA) 20 mg tablet    QUEtiapine (SEROquel) 25 mg tablet    Essential (primary) hypertension     I10    Relevant Medications    isosorbide dinitrate (Isordil) 10 mg tablet    Anorexia     R63.0    Relevant Medications    mirtazapine (Remeron) 30 mg tablet    Aneurysm of carotid artery (CMS-HCC)     I72.0    Relevant Orders    Referral to Neurosurgery    Failure to thrive in adult     R62.7    Relevant Orders    Referral to Nutrition Services    Follow Up In Advanced Primary Care - Care Manager    Referral to Home Care          Scribe Attestation  By signing my name below, I, Magi Lackey MA , Scribe   attest that this documentation has been prepared under the " direction and in the presence of Duglas Giraldo DO.    Provider Attestation - Scribe documentation    All medical record entries made by the Scribe were at my direction and personally dictated by me. I have reviewed the chart and agree that the record accurately reflects my personal performance of the history, physical exam, discussion and plan.

## 2024-12-17 NOTE — PATIENT INSTRUCTIONS
Follow up in 4 weeks    Continue current medications and therapy for chronic medical conditions.    Patient was advised importance of proper diet/nutrition in addition adequate hydration. Patient was encouraged moderate exercise program to include 30 minutes daily for 5 days of the week or 150 minutes weekly. Patient will follow-up with us as scheduled.    Neurosurgeon referral    Nutrition referral    Review recent hospitalization lab results and    Review discharge medications and instructions    Home health care referral    Referral to care management

## 2024-12-18 ENCOUNTER — HOME CARE VISIT (OUTPATIENT)
Dept: HOME HEALTH SERVICES | Facility: HOME HEALTH | Age: 89
End: 2024-12-18
Payer: MEDICARE

## 2024-12-18 VITALS
TEMPERATURE: 98.6 F | SYSTOLIC BLOOD PRESSURE: 156 MMHG | DIASTOLIC BLOOD PRESSURE: 68 MMHG | HEART RATE: 75 BPM | RESPIRATION RATE: 17 BRPM | OXYGEN SATURATION: 99 %

## 2024-12-18 PROCEDURE — 169592 NO-PAY CLAIM PROCEDURE

## 2024-12-18 PROCEDURE — G0299 HHS/HOSPICE OF RN EA 15 MIN: HCPCS | Mod: HHH

## 2024-12-18 ASSESSMENT — ENCOUNTER SYMPTOMS
CHANGE IN APPETITE: DECREASED
PERSON REPORTING PAIN: PATIENT
APPETITE LEVEL: POOR
MUSCLE WEAKNESS: 1
DENIES PAIN: 1
LOWER EXTREMITY EDEMA: 1
DESCRIPTION OF MEMORY LOSS: SHORT TERM
CONSTIPATION: 1

## 2024-12-18 ASSESSMENT — ACTIVITIES OF DAILY LIVING (ADL)
OASIS_M1830: 03
ENTERING_EXITING_HOME: MODERATE ASSIST

## 2024-12-19 ENCOUNTER — HOME CARE VISIT (OUTPATIENT)
Dept: HOME HEALTH SERVICES | Facility: HOME HEALTH | Age: 89
End: 2024-12-19
Payer: MEDICARE

## 2024-12-19 ENCOUNTER — TELEPHONE (OUTPATIENT)
Dept: PRIMARY CARE | Facility: CLINIC | Age: 89
End: 2024-12-19
Payer: MEDICARE

## 2024-12-19 VITALS
TEMPERATURE: 97.7 F | SYSTOLIC BLOOD PRESSURE: 83 MMHG | RESPIRATION RATE: 18 BRPM | HEART RATE: 76 BPM | DIASTOLIC BLOOD PRESSURE: 53 MMHG

## 2024-12-19 PROCEDURE — G0151 HHCP-SERV OF PT,EA 15 MIN: HCPCS | Mod: HHH

## 2024-12-19 SDOH — HEALTH STABILITY: PHYSICAL HEALTH: EXERCISE TYPE: BLE PER HEP

## 2024-12-19 ASSESSMENT — ENCOUNTER SYMPTOMS
PERSON REPORTING PAIN: PATIENT
DENIES PAIN: 1

## 2024-12-19 ASSESSMENT — ACTIVITIES OF DAILY LIVING (ADL)
AMBULATION ASSISTANCE ON FLAT SURFACES: 1
AMBULATION ASSISTANCE: STAND BY ASSIST
AMBULATION_DISTANCE/DURATION_TOLERATED: 50'
AMBULATION ASSISTANCE: 1
PHYSICAL TRANSFERS ASSESSED: 1
CURRENT_FUNCTION: STAND BY ASSIST

## 2024-12-19 NOTE — TELEPHONE ENCOUNTER
Dr. Giraldo patient    Patient son is calling - would like to know if doctor is going to call ing her citalopram (CeleXA) 20 mg tablet.      Pharmacy: [facility] [place], [place] - [address] [address]      Also, he wanted to let the doctor know that PT was there for her evaluation today and her BP was 102/62. They her stand up and re-checked her BP and it was lower. He would like to know if the doctor would like to make any changes to her medication,    Please advise, thank you.

## 2024-12-20 ENCOUNTER — PATIENT OUTREACH (OUTPATIENT)
Dept: PRIMARY CARE | Facility: CLINIC | Age: 89
End: 2024-12-20
Payer: MEDICARE

## 2024-12-20 DIAGNOSIS — F41.1 GAD (GENERALIZED ANXIETY DISORDER): ICD-10-CM

## 2024-12-20 DIAGNOSIS — I10 ESSENTIAL (PRIMARY) HYPERTENSION: ICD-10-CM

## 2024-12-20 DIAGNOSIS — F02.B4 MODERATE LATE ONSET ALZHEIMER'S DEMENTIA WITH ANXIETY (MULTI): ICD-10-CM

## 2024-12-20 DIAGNOSIS — G30.1 MODERATE LATE ONSET ALZHEIMER'S DEMENTIA WITH ANXIETY (MULTI): ICD-10-CM

## 2024-12-20 NOTE — PROGRESS NOTES
Introduced patient's son Keegan to Chronic Care Management Program.   Explained that in my role as Care Manager, I will:  - Be a point of contact with questions and concerns  - Focus on chronic conditions and achieving health goals  - Make sure patient is receiving all preventative care he/she is due for    Verbal consent given to enroll.  My contact info was provided for any needs that may arise.  Pt  and son are aware that I will call to check in the next 4 weeks.  Keegan's main concerns at this time are:    Hypotension: Pt with history of hypertension, currently taking Isosorbide dinitrate 10 mg in the morning, and Amlodipine 5 mg in the evening. Keegan says pt has changes in her BP from sitting to standing. PT at the home and noted it decreased into the 90's systolic from 100's. This RN advised Keegan to keep a BP journal over the several days. Asked him to check her BP in the morning, before her medication. Then, check it a few hours after but the same time every day. Then, asked him to check it at night, before he gives her the Amlodipine. Plan is to follow up with him on 12/24/24 for findings. Dr. Giraldo can then determine need for medication changes.     Alzheimer's Dementia:  Pt has decreased appetite and fluid intake. Pt recently hospitalized with syncope and dehydration. Keegan is trying to encourage pt to eat and drink fluids but is finding it difficult. Pt is not following a particular diet, he is simply just trying to get her to eat anything.    Pt has not sustained any falls. Pt has several canes but tends not to use them around the house.     12/31/24 - [facility]  01/10/25 - PCP with Dr. Giraldo  01/13/25 - Neurosurgery    This RN CM ensured pt and son have direct contact number and encouraged to call with any questions or concerns.

## 2024-12-24 ENCOUNTER — PATIENT OUTREACH (OUTPATIENT)
Dept: PRIMARY CARE | Facility: CLINIC | Age: 89
End: 2024-12-24
Payer: MEDICARE

## 2024-12-24 NOTE — PROGRESS NOTES
"Call regarding appt. with PCP on 12/17/24 after hospitalization.  At time of outreach call the patient feels as if their condition has improved since last visit.  Reviewed the PCP appointment with the pt and addressed any questions or concerns.  Pt's son Cameron says he was able to  pt's Celexa from the pharmacy. Cameron says pt's BP has improved, noting morning highs around 150/80's and post medication SBP lows around 116. Specific readings not provided. Cameron says pt is \"bouncing around the house\" and seems to be doing much better. No concerns at time of call.    "

## 2024-12-30 ENCOUNTER — HOME CARE VISIT (OUTPATIENT)
Dept: HOME HEALTH SERVICES | Facility: HOME HEALTH | Age: 89
End: 2024-12-30
Payer: MEDICARE

## 2024-12-30 VITALS
HEART RATE: 76 BPM | SYSTOLIC BLOOD PRESSURE: 124 MMHG | RESPIRATION RATE: 18 BRPM | DIASTOLIC BLOOD PRESSURE: 74 MMHG | TEMPERATURE: 99.4 F | OXYGEN SATURATION: 100 %

## 2024-12-30 PROCEDURE — G0299 HHS/HOSPICE OF RN EA 15 MIN: HCPCS | Mod: HHH

## 2024-12-30 ASSESSMENT — ENCOUNTER SYMPTOMS
COUGH CHARACTERISTICS: MOIST
DENIES PAIN: 1
APPETITE LEVEL: POOR
PERSON REPORTING PAIN: PATIENT
CHANGE IN APPETITE: UNCHANGED
COUGH: 1
DESCRIPTION OF MEMORY LOSS: SHORT TERM

## 2024-12-31 ENCOUNTER — PATIENT OUTREACH (OUTPATIENT)
Dept: CARE COORDINATION | Facility: CLINIC | Age: 89
End: 2024-12-31
Payer: MEDICARE

## 2024-12-31 NOTE — PROGRESS NOTES
Spoke to son of pt; States po intake has improved this week and he doesn't have concerns at this time; requested handouts on FTT and nutrition recommendations for pt. Encouraged son to reach out for any other questions or concerns.

## 2025-01-07 ENCOUNTER — PATIENT OUTREACH (OUTPATIENT)
Dept: PRIMARY CARE | Facility: CLINIC | Age: OVER 89
End: 2025-01-07
Payer: MEDICARE

## 2025-01-07 DIAGNOSIS — I10 ESSENTIAL (PRIMARY) HYPERTENSION: ICD-10-CM

## 2025-01-07 DIAGNOSIS — F02.B4 MODERATE LATE ONSET ALZHEIMER'S DEMENTIA WITH ANXIETY (MULTI): ICD-10-CM

## 2025-01-07 DIAGNOSIS — G30.1 MODERATE LATE ONSET ALZHEIMER'S DEMENTIA WITH ANXIETY (MULTI): ICD-10-CM

## 2025-01-07 NOTE — PROGRESS NOTES
"Phone call for monthly RN CM outreach.  Chart reviewed prior to call.    Spoke with pt son Cameron. Pt with dementia and is a poor historian.  Cameron feels pt's dementia is progressing. Pt was found making \"a snack\" recently, combining foods that don't make any sense. Pt is also having increased difficulty playing Solitaire on the computer like she used to.     Caregiver needs have increased. Cameron's sister has been out of town since Christmas and he has been solely caring for patient. Cameron is feeling worn down as pt can not be left alone. Cameron says his daughter and niece work at [employer] and have started talking about placing pt into long term care. This RN CM recommended he talk to Dr. Giraldo about this and possible seek assistance navigating the financial component with facility social work.     Pt's BP has been well controlled. Pt does have episodes of lightheadedness but Cameron says it occurs when she moves too fast. Pt has a walker and cane but does not use either. He recently purchased a transport chair to take pt out easier. Pt has sustained no falls since last outreach.     01/10/25 PCP with Dr. Giraldo  01/13/25 Neurosurgery with [facility] Gong  01/16/25 [facility], possible discharge    Pt son, Cameron, wishes to continue with CCM at this time. This RN CM ensured Cameron has direct contact number and encouraged to call with any questions or concerns.  "

## 2025-01-10 ENCOUNTER — APPOINTMENT (OUTPATIENT)
Dept: PRIMARY CARE | Facility: CLINIC | Age: OVER 89
End: 2025-01-10
Payer: MEDICARE

## 2025-01-10 VITALS
DIASTOLIC BLOOD PRESSURE: 58 MMHG | RESPIRATION RATE: 18 BRPM | TEMPERATURE: 97.5 F | BODY MASS INDEX: 24.35 KG/M2 | WEIGHT: 129 LBS | HEIGHT: 61 IN | OXYGEN SATURATION: 98 % | HEART RATE: 70 BPM | SYSTOLIC BLOOD PRESSURE: 110 MMHG

## 2025-01-10 DIAGNOSIS — I10 HTN (HYPERTENSION), BENIGN: ICD-10-CM

## 2025-01-10 DIAGNOSIS — R41.3 MEMORY CHANGES: ICD-10-CM

## 2025-01-10 DIAGNOSIS — E03.9 ACQUIRED HYPOTHYROIDISM: ICD-10-CM

## 2025-01-10 DIAGNOSIS — E78.5 DYSLIPIDEMIA: ICD-10-CM

## 2025-01-10 DIAGNOSIS — E55.9 VITAMIN D DEFICIENCY: ICD-10-CM

## 2025-01-10 DIAGNOSIS — G30.1 MODERATE LATE ONSET ALZHEIMER'S DEMENTIA WITH ANXIETY (MULTI): ICD-10-CM

## 2025-01-10 DIAGNOSIS — F02.B4 MODERATE LATE ONSET ALZHEIMER'S DEMENTIA WITH ANXIETY (MULTI): ICD-10-CM

## 2025-01-10 DIAGNOSIS — Z00.00 MEDICARE ANNUAL WELLNESS VISIT, SUBSEQUENT: Primary | ICD-10-CM

## 2025-01-10 PROCEDURE — 99497 ADVNCD CARE PLAN 30 MIN: CPT | Performed by: FAMILY MEDICINE

## 2025-01-10 PROCEDURE — G0439 PPPS, SUBSEQ VISIT: HCPCS | Performed by: FAMILY MEDICINE

## 2025-01-10 ASSESSMENT — ENCOUNTER SYMPTOMS
FLANK PAIN: 0
FATIGUE: 0
COUGH: 0
SORE THROAT: 0
MYALGIAS: 0
STRIDOR: 0
BLOOD IN STOOL: 0
SEIZURES: 0
CONSTIPATION: 0
ACTIVITY CHANGE: 0
SHORTNESS OF BREATH: 0
DIZZINESS: 0
PALPITATIONS: 0
OCCASIONAL FEELINGS OF UNSTEADINESS: 0
CHEST TIGHTNESS: 0
RECTAL PAIN: 0
ABDOMINAL PAIN: 0
ADENOPATHY: 0
FEVER: 0
SINUS PRESSURE: 0
SPEECH DIFFICULTY: 0
LOSS OF SENSATION IN FEET: 0
APPETITE CHANGE: 1
POLYPHAGIA: 0
EYE PAIN: 0
DEPRESSION: 0
COLOR CHANGE: 0
HEADACHES: 0
PHOTOPHOBIA: 0
RHINORRHEA: 0
AGITATION: 0
TROUBLE SWALLOWING: 0
SLEEP DISTURBANCE: 0
POLYDIPSIA: 0
NECK STIFFNESS: 0
NERVOUS/ANXIOUS: 0
SINUS PAIN: 0
DYSPHORIC MOOD: 0
HEMATURIA: 0
DYSURIA: 0
DIARRHEA: 0
ABDOMINAL DISTENTION: 0

## 2025-01-10 ASSESSMENT — ACTIVITIES OF DAILY LIVING (ADL)
DOING_HOUSEWORK: NEEDS ASSISTANCE
MANAGING_FINANCES: NEEDS ASSISTANCE
DRESSING: INDEPENDENT
TAKING_MEDICATION: NEEDS ASSISTANCE
BATHING: NEEDS ASSISTANCE
GROCERY_SHOPPING: NEEDS ASSISTANCE

## 2025-01-10 ASSESSMENT — PATIENT HEALTH QUESTIONNAIRE - PHQ9
1. LITTLE INTEREST OR PLEASURE IN DOING THINGS: NOT AT ALL
SUM OF ALL RESPONSES TO PHQ9 QUESTIONS 1 AND 2: 0
2. FEELING DOWN, DEPRESSED OR HOPELESS: NOT AT ALL

## 2025-01-10 NOTE — PROGRESS NOTES
Subjective   Reason for Visit: Lakeisha Moyer is an 89 y.o. female here for a Medicare Wellness visit.     Past Medical, Surgical, and Family History reviewed and updated in chart.         Patient states having poor appetite.    Patient would like have a blood work ordered.    Patient denies any symptoms or concerns today.      Patient Care Team:  Duglas Giraldo DO as PCP - General  Duglas Giraldo DO as PCP - [facility] Attributed Provider  Duglas Giraldo DO as Referring Physician (Family Medicine)  Milka Rivera RN as Care Manager (Case Management)     Review of Systems   Constitutional:  Positive for appetite change. Negative for activity change, fatigue and fever.   HENT:  Negative for congestion, dental problem, ear discharge, ear pain, mouth sores, rhinorrhea, sinus pressure, sinus pain, sore throat, tinnitus and trouble swallowing.    Eyes:  Negative for photophobia, pain and visual disturbance.   Respiratory:  Negative for cough, chest tightness, shortness of breath and stridor.    Cardiovascular:  Negative for chest pain and palpitations.   Gastrointestinal:  Negative for abdominal distention, abdominal pain, blood in stool, constipation, diarrhea and rectal pain.   Endocrine: Negative for cold intolerance, heat intolerance, polydipsia, polyphagia and polyuria.   Genitourinary:  Negative for dysuria, flank pain, hematuria and urgency.   Musculoskeletal:  Positive for arthralgias, back pain and gait problem. Negative for myalgias and neck stiffness.   Skin:  Negative for color change and rash.   Allergic/Immunologic: Negative for environmental allergies and food allergies.   Neurological:  Negative for dizziness, seizures, syncope, speech difficulty and headaches.   Hematological:  Negative for adenopathy.   Psychiatric/Behavioral:  Positive for confusion and decreased concentration. Negative for agitation, dysphoric mood and sleep disturbance. The patient is not nervous/anxious.   "      Objective   Vitals:  /58 (BP Location: Right arm, Patient Position: Sitting, BP Cuff Size: Adult)   Pulse 70   Temp 36.4 °C (97.5 °F) (Skin)   Resp 18   Ht 1.549 m (5' 1\")   Wt 58.5 kg (129 lb)   SpO2 98%   BMI 24.37 kg/m²       Physical Exam  Vitals reviewed.   Constitutional:       General: She is not in acute distress.     Appearance: Normal appearance. She is normal weight. She is not ill-appearing or diaphoretic.   HENT:      Head: Normocephalic.      Right Ear: Tympanic membrane and external ear normal.      Left Ear: Tympanic membrane and external ear normal.      Nose: Nose normal. No congestion.      Mouth/Throat:      Pharynx: No posterior oropharyngeal erythema.   Eyes:      General:         Right eye: No discharge.         Left eye: No discharge.      Extraocular Movements: Extraocular movements intact.      Conjunctiva/sclera: Conjunctivae normal.      Pupils: Pupils are equal, round, and reactive to light.   Cardiovascular:      Rate and Rhythm: Normal rate and regular rhythm.      Pulses: Normal pulses.      Heart sounds: Normal heart sounds. No murmur heard.  Pulmonary:      Effort: Pulmonary effort is normal. No respiratory distress.      Breath sounds: Normal breath sounds. No wheezing or rales.   Chest:      Chest wall: No tenderness.   Abdominal:      General: Abdomen is flat. Bowel sounds are normal. There is no distension.      Palpations: There is no mass.      Tenderness: There is no abdominal tenderness. There is no guarding.   Musculoskeletal:         General: No tenderness. Normal range of motion.      Cervical back: Normal range of motion and neck supple. No tenderness.      Right lower leg: No edema.      Left lower leg: No edema.   Skin:     General: Skin is dry.      Coloration: Skin is not jaundiced.      Findings: No bruising, erythema or rash.   Neurological:      General: No focal deficit present.      Mental Status: She is alert and oriented to person, place, " and time. Mental status is at baseline.      Cranial Nerves: No cranial nerve deficit.      Sensory: No sensory deficit.      Coordination: Coordination abnormal.      Gait: Gait abnormal.   Psychiatric:         Thought Content: Thought content normal.         Judgment: Judgment normal.      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 The details of that discussion were documented in Advanced Directives Discussion section of the medical record.       Scribe Attestation  By signing my name below, IMadison RMA, Scribe   attest that this documentation has been prepared under the direction and in the presence of Duglas Giraldo DO.   Provider Attestation - Scribe documentation    All medical record entries made by the Scribe were at my direction and personally dictated by me. I have reviewed the chart and agree that the record accurately reflects my personal performance of the history, physical exam, discussion and plan.

## 2025-01-10 NOTE — PATIENT INSTRUCTIONS
Follow up in 3 months    Continue current medications and therapy for chronic medical conditions.    Patient was advised importance of proper diet/nutrition in addition adequate hydration. Patient was encouraged moderate exercise program to include 30 minutes daily for 5 days of the week or 150 minutes weekly. Patient will follow-up with us as scheduled.    Complete Medicare annual wellness physical/exam     Obtain Fasting Lipid, CMP, CBC, TSH, Vitamin D, Sed Rate    Counseled on advanced directives/16 minutes    Review lab results from December 2024    Review of CT scan of head and CT angio from December 2024

## 2025-01-12 ASSESSMENT — ENCOUNTER SYMPTOMS
CONFUSION: 1
BACK PAIN: 1
DECREASED CONCENTRATION: 1
ARTHRALGIAS: 1

## 2025-01-13 DIAGNOSIS — I10 HTN (HYPERTENSION), BENIGN: ICD-10-CM

## 2025-01-13 RX ORDER — AMLODIPINE BESYLATE 5 MG/1
5 TABLET ORAL DAILY
Qty: 30 TABLET | Refills: 1 | Status: SHIPPED | OUTPATIENT
Start: 2025-01-13

## 2025-01-13 NOTE — TELEPHONE ENCOUNTER
Recent Visits  Date Type Provider Dept   01/10/25 Office Visit [facility]iraldo DO [facility]   Showing recent visits within past 180 days and meeting all other requirements  Future Appointments  No visits were found meeting these conditions.  Showing future appointments within next 90 days and meeting all other requirements

## 2025-01-16 ENCOUNTER — HOME CARE VISIT (OUTPATIENT)
Dept: HOME HEALTH SERVICES | Facility: HOME HEALTH | Age: OVER 89
End: 2025-01-16
Payer: MEDICARE

## 2025-01-16 VITALS
SYSTOLIC BLOOD PRESSURE: 122 MMHG | TEMPERATURE: 98.1 F | DIASTOLIC BLOOD PRESSURE: 64 MMHG | HEART RATE: 70 BPM | OXYGEN SATURATION: 98 % | RESPIRATION RATE: 16 BRPM

## 2025-01-16 PROCEDURE — G0299 HHS/HOSPICE OF RN EA 15 MIN: HCPCS | Mod: HHH

## 2025-01-17 ASSESSMENT — ENCOUNTER SYMPTOMS
PERSON REPORTING PAIN: PATIENT
DENIES PAIN: 1

## 2025-01-17 ASSESSMENT — ACTIVITIES OF DAILY LIVING (ADL)
HOME_HEALTH_OASIS: 01
OASIS_M1830: 02

## 2025-02-05 ENCOUNTER — PATIENT OUTREACH (OUTPATIENT)
Dept: PRIMARY CARE | Facility: CLINIC | Age: OVER 89
End: 2025-02-05
Payer: MEDICARE

## 2025-02-05 DIAGNOSIS — G30.1 MODERATE LATE ONSET ALZHEIMER'S DEMENTIA WITH ANXIETY (MULTI): ICD-10-CM

## 2025-02-05 DIAGNOSIS — F02.B4 MODERATE LATE ONSET ALZHEIMER'S DEMENTIA WITH ANXIETY (MULTI): ICD-10-CM

## 2025-02-05 DIAGNOSIS — I10 HTN (HYPERTENSION), BENIGN: ICD-10-CM

## 2025-02-05 NOTE — PROGRESS NOTES
Phone call for monthly RN CM outreach.  Chart reviewed prior to call.    Spoke with pt's son Cameron. He reports pt is doing well, no concerns.    Pt's BP is usually elevated upon rising but has been coming down well after her medications.   BP Readings from Last 1 Encounters:   01/16/25 122/64      Pt with adequate amount of food and drink intake. Pt is taking her medications well, no behaviors.    Per Cameron, pt had a guided lowering to the floor after she twisted her ankle while walking. Cameron assisted her and didn't feel she sustained an injury. He says she had some dark discoloration above her toes but it has resolved in the three weeks since the incident. Pt has no outward signs of injury at this time.    Cameron denies medication needs or concerns at this time.    05/12/25 PCP with Dr. Giraldo    Pt wishes to continue with CCM at this time. This RN CM ensured pt has direct contact number and encouraged to call with any questions or concerns.

## 2025-03-05 ENCOUNTER — PATIENT OUTREACH (OUTPATIENT)
Dept: PRIMARY CARE | Facility: CLINIC | Age: OVER 89
End: 2025-03-05
Payer: MEDICARE

## 2025-03-05 DIAGNOSIS — F02.B4 MODERATE LATE ONSET ALZHEIMER'S DEMENTIA WITH ANXIETY (MULTI): ICD-10-CM

## 2025-03-05 DIAGNOSIS — G30.1 MODERATE LATE ONSET ALZHEIMER'S DEMENTIA WITH ANXIETY (MULTI): ICD-10-CM

## 2025-03-05 DIAGNOSIS — I10 HTN (HYPERTENSION), BENIGN: ICD-10-CM

## 2025-03-05 NOTE — PROGRESS NOTES
"Phone call for monthly RN CM outreach.  Chart reviewed prior to call.  Verified pt identity with name and .    Pt is poor historian due to dementia. Spoke with pt's son Jack.    Per Jack, pt is doing very well, no immediate concerns. Pt would attempt to cook so Jack has resorted to simply turning off the gas to the stove to ensure safety.    No falls since last outreach.    Pt is eating and drinking well and having no problems with swallowing.     Pt incontinence seems to have improved.     Jack denies concerns with BP and has no medication needs at this time.    As primary caregiver, Jack says he's feeling a little better then he was in the past. He still feels \"trapped\" in the house sometimes but this is improving. Pt does not like to leave the house but will go out every few weeks to the hair salon. Pt's daughter also living in the home and will remain with pt while Jack steps out for periods of time as needed. There are other siblings in the area but they visit infrequently. Encouraged Jack to allow time for himself by asking siblings for help staying with pt. Reminded him of the importance of self care.     25 PCP with Dr. Giraldo    Pt wishes to continue with CCM at this time. This RN CM ensured pt has direct contact number and encouraged to call with any questions or concerns.    "

## 2025-04-09 ENCOUNTER — PATIENT OUTREACH (OUTPATIENT)
Dept: PRIMARY CARE | Facility: CLINIC | Age: OVER 89
End: 2025-04-09
Payer: MEDICARE

## 2025-04-09 DIAGNOSIS — G30.1 MODERATE LATE ONSET ALZHEIMER'S DEMENTIA WITH ANXIETY (MULTI): ICD-10-CM

## 2025-04-09 DIAGNOSIS — F02.B4 MODERATE LATE ONSET ALZHEIMER'S DEMENTIA WITH ANXIETY (MULTI): ICD-10-CM

## 2025-04-09 DIAGNOSIS — I10 HTN (HYPERTENSION), BENIGN: ICD-10-CM

## 2025-04-09 NOTE — PROGRESS NOTES
Phone call for monthly RN CM outreach.  Chart reviewed prior to call.    Spoke with pt's son Cameron. Pt is doing well. Cameron denies any concerning changes in condition.     Pt has sustained no recent falls    Pt is eating and drinking well and also taking her medications without incident. Pt continues to be calm and cooperative.    Pt does not like to leave the home but with the weather improving, Cameron may try and get her out if she'll allow. Reminded him of pt's upcoming appointment with Dr. Deleon.     Future Appointments   Date Time Provider Department Center   5/12/2025  2:15 PM Duglas Giraldo DO [id number] [place]     No expressed concern regarding pt's BP. Pt has suffered for many years with lightheadedness and dizziness as a result of changing positions too quickly. No dizziness while ambulating or at rest.     No medication refill needs at this time.    Pt wishes to continue with CCM at this time. This RN CM ensured pt has direct contact number and encouraged to call with any questions or concerns.

## 2025-05-05 ENCOUNTER — PATIENT OUTREACH (OUTPATIENT)
Dept: PRIMARY CARE | Facility: CLINIC | Age: OVER 89
End: 2025-05-05
Payer: MEDICARE

## 2025-05-05 DIAGNOSIS — F02.B4 MODERATE LATE ONSET ALZHEIMER'S DEMENTIA WITH ANXIETY (MULTI): ICD-10-CM

## 2025-05-05 DIAGNOSIS — I10 HTN (HYPERTENSION), BENIGN: ICD-10-CM

## 2025-05-05 DIAGNOSIS — G30.1 MODERATE LATE ONSET ALZHEIMER'S DEMENTIA WITH ANXIETY (MULTI): ICD-10-CM

## 2025-05-05 NOTE — PROGRESS NOTES
Care Management Monthly Outreach  Chart review completed  Confirmation of at least 2 patient identifiers  Change in insurance? No    Has patient been to ER/Urgent Care since last outreach? No    Last Office Visit with PCP: 1/10/2025   Next Office Visit with PCP: 5/12/2025   APC Collaboration: n/a    Chronic Conditions and Outreach Summary:   Moderate late onset Alzheimer's dementia with anxiety (Multi)    HTN (hypertension), benign    Spoke with pt's son Cameron. No significant changes reported. Pt's memory seems to be getting worse but otherwise, no changes. Pt enjoys keeping busy around the house, loading the , wiping the counters, and cleaning. Pt's appetite is good. Cameron feels she is eating and drinking an adequate amount. Pt is taking her medications without issue.     Cameron feels stress as her primary caregiver but remains committed to caring for pt. Offered resources for respite through organizations that support caregivers of those with Alzheimer's but Cameron declined at this time. Will notify this RN CM if/when he decides he needs it. Pt's daughter also lives in the home and helps when she can.     Cameron denies BP concerns. Pt with poor safety awareness and will often change positions quickly and c/o dizziness. Cameron reminds her to take her time. No report of syncope or falls.    Medications:   Are there medication changes since last visit? No  Refills needed? No    Social Drivers of Health: Complete  Care Gaps Addressed? Complete  Care Plan addressed: Yes    Upcoming Appointments:   Future Appointments       Date / Time Provider Department Dept Phone    5/12/2025 2:15 PM (Arrive by 2:00 PM) Duglas Giraldo DO [facility] [phone]          Blood Pressures Reviewed  BP Readings from Last 3 Encounters:   01/16/25 122/64   01/10/25 110/58   12/30/24 124/74     Labs Reviewed:  Lab Results   Component Value Date    CREATININE 1.18 (H) 10/22/2022    GLUCOSE 116 (H) 10/22/2022    ALKPHOS  "54 10/22/2022    K 4.0 10/22/2022    PROT 7.1 10/22/2022     10/22/2022    CALCIUM 9.0 10/22/2022    AST 22 10/22/2022    ALT 11 10/22/2022    BUN 10 10/22/2022    GFRF 45 (A) 10/22/2022     Lab Results   Component Value Date    TRIG 130 02/26/2022    CHOL 210 (H) 02/26/2022    HDL 77.0 02/26/2022     No results found for: \"HGBA1C\"  Lab Results   Component Value Date    WBC 3.8 (L) 10/22/2022    RBC 4.24 10/22/2022    HGB 13.5 10/22/2022     10/22/2022   No other concerns at this time.  Agreeable to continue monthly outreaches.  Encouraged to call if questions or concerns arise.    Milka Rivera RN  Chronic Care Manager  [phone]    "

## 2025-05-12 ENCOUNTER — APPOINTMENT (OUTPATIENT)
Dept: PRIMARY CARE | Facility: CLINIC | Age: OVER 89
End: 2025-05-12
Payer: MEDICARE

## 2025-05-12 VITALS
TEMPERATURE: 98 F | OXYGEN SATURATION: 97 % | SYSTOLIC BLOOD PRESSURE: 142 MMHG | BODY MASS INDEX: 24.96 KG/M2 | WEIGHT: 132.2 LBS | DIASTOLIC BLOOD PRESSURE: 82 MMHG | RESPIRATION RATE: 16 BRPM | HEIGHT: 61 IN | HEART RATE: 74 BPM

## 2025-05-12 DIAGNOSIS — E03.9 ACQUIRED HYPOTHYROIDISM: ICD-10-CM

## 2025-05-12 DIAGNOSIS — R41.3 MEMORY CHANGES: ICD-10-CM

## 2025-05-12 DIAGNOSIS — F41.1 GAD (GENERALIZED ANXIETY DISORDER): ICD-10-CM

## 2025-05-12 DIAGNOSIS — I10 HTN (HYPERTENSION), BENIGN: Primary | ICD-10-CM

## 2025-05-12 DIAGNOSIS — J30.1 SEASONAL ALLERGIC RHINITIS DUE TO POLLEN: ICD-10-CM

## 2025-05-12 PROCEDURE — 99214 OFFICE O/P EST MOD 30 MIN: CPT | Performed by: FAMILY MEDICINE

## 2025-05-12 PROCEDURE — G2211 COMPLEX E/M VISIT ADD ON: HCPCS | Performed by: FAMILY MEDICINE

## 2025-05-12 NOTE — PROGRESS NOTES
"Subjective   Patient ID: Lakeisha Moyer \"Tish" is a 89 y.o. female who presents for Follow-up (Pt presents today with hypertension and doing ok on her medications pt currently take 2 different bp medication one at night and the other in morning).  History of Present Illness  Lakeisha Moyer \"Tish" is an 89 year old female who presents for medication management and blood pressure monitoring.    Her blood pressure is usually well-controlled at home when she takes her medications. However, she did not take her morning medications today, which may contribute to borderline blood pressure readings. Her caregiver usually sets out her medications and ensures she takes them, but this was not done today.    She is currently taking Seroquel, which helps her with sleep. She typically stays up late, often going to bed around 1:30 AM, and this has been her routine for many years. She enjoys watching TV and playing jslyhlire on the computer during the night. Her caregiver administers a half pill of Seroquel every evening.    She has a long-standing habit of staying up late at night, which aligns with her previous work schedule of night shifts.    Review of Systems   Constitutional: Negative.  Negative for activity change, appetite change, fatigue and fever.   HENT:  Negative for congestion, dental problem, ear discharge, ear pain, mouth sores, rhinorrhea, sinus pressure, sinus pain, sore throat, tinnitus and trouble swallowing.    Eyes:  Negative for photophobia, pain and visual disturbance.   Respiratory:  Negative for cough, chest tightness, shortness of breath and stridor.    Cardiovascular:  Negative for chest pain and palpitations.   Gastrointestinal:  Negative for abdominal distention, abdominal pain, blood in stool, constipation, diarrhea and rectal pain.   Endocrine: Negative for cold intolerance, heat intolerance, polydipsia, polyphagia and polyuria.   Genitourinary:  Negative for dysuria, flank pain, " "hematuria and urgency.   Musculoskeletal:  Negative for arthralgias, gait problem, myalgias and neck stiffness.   Skin:  Negative for color change and rash.   Allergic/Immunologic: Negative for environmental allergies and food allergies.   Neurological:  Negative for dizziness, seizures, syncope, speech difficulty and headaches.   Hematological:  Negative for adenopathy.   Psychiatric/Behavioral:  Negative for agitation, confusion, decreased concentration, dysphoric mood and sleep disturbance. The patient is not nervous/anxious.    The above were reviewed and noted negative except as noted in HPI and Problem List.    Objective     /82 (BP Location: Right arm, Patient Position: Sitting, BP Cuff Size: Adult)   Pulse 74   Temp 36.7 °C (98 °F) (Temporal)   Resp 16   Ht 1.549 m (5' 1\")   Wt 60 kg (132 lb 3.2 oz)   SpO2 97%   BMI 24.98 kg/m²      Physical Exam    Constitutional: Well developed, well nourished, alert and in no acute distress   Eyes: Normal external exam. Pupils equally round and reactive to light with normal accommodation and extraocular movements intact.  Neck: Supple, no lymphadenopathy or masses.   Cardiovascular: Regular rate and rhythm, normal S1 and S2, no murmurs, gallops, or rubs. Radial pulses normal. No peripheral edema.  Pulmonary: No respiratory distress, lungs clear to auscultation bilaterally. No wheezes, rhonchi, rales.  Abdomen: soft,non tender, non distended, without masses or HSM  Skin: Warm, well perfused, normal skin turgor and color.   Neurologic: Cranial nerves II-XII grossly intact.   Psychiatric: Mood calm and affect normal  Musculoskeletal: Moving all extremities without restriction  The above were reviewed and noted negative except as noted in HPI and Problem List.    Problem List Items Addressed This Visit    None       Assessment & Plan  Moderate late onset Alzheimer's dementia with anxiety  Seroquel aids in sleep, aligning with her late-night lifestyle. " Consideration to increase Seroquel to a whole pill in the evening to evaluate sleep impact.  - Increase Seroquel to a whole pill in the evening for three to four days to assess sleep impact.    Hypertension  Blood pressure is generally well-controlled with medication. She missed her morning dose today, contributing to borderline readings. Her blood pressure normalizes an hour after taking her morning medications.  - Ensure adherence to morning antihypertensive regimen.    Results         Duglas Giraldo DO     This medical note was created with the assistance of artificial intelligence (AI) for documentation purposes. The content has been reviewed and confirmed by the healthcare provider for accuracy and completeness. Patient consented to the use of audio recording and use of AI during their visit.

## 2025-05-18 NOTE — PATIENT INSTRUCTIONS
VISIT SUMMARY:  Today, we discussed your medication management and blood pressure monitoring. Your blood pressure is usually well-controlled when you take your medications, but you missed your morning dose today, which may have caused the borderline readings. We also talked about your sleep routine and the use of Seroquel to help you sleep.    YOUR PLAN:  -MODERATE LATE ONSET ALZHEIMER'S DEMENTIA WITH ANXIETY: This condition involves memory loss and anxiety that started later in life. To help with your sleep, we will increase your Seroquel to a whole pill in the evening for three to four days and see how it affects your sleep.    -HYPERTENSION: Hypertension means high blood pressure. Your blood pressure is usually well-controlled with your medication, but missing your morning dose today may have caused the borderline readings. Please make sure to take your morning blood pressure medication as prescribed.    INSTRUCTIONS:  Please follow the new Seroquel dosage of a whole pill in the evening for the next three to four days and monitor how it affects your sleep. Also, ensure you take your morning blood pressure medication every day to keep your blood pressure under control.

## 2025-05-24 DIAGNOSIS — R63.0 ANOREXIA: ICD-10-CM

## 2025-05-27 RX ORDER — MIRTAZAPINE 30 MG/1
30 TABLET, FILM COATED ORAL NIGHTLY
Qty: 90 TABLET | Refills: 1 | Status: SHIPPED | OUTPATIENT
Start: 2025-05-27

## 2025-05-27 NOTE — TELEPHONE ENCOUNTER
Recent Visits  Date Type Provider Dept   05/12/25 Office Visit [facility]   Showing recent visits within past 90 days and meeting all other requirements  Future Appointments  No visits were found meeting these conditions.  Showing future appointments within next 90 days and meeting all other requirements

## 2025-06-04 ENCOUNTER — PATIENT OUTREACH (OUTPATIENT)
Dept: PRIMARY CARE | Facility: CLINIC | Age: OVER 89
End: 2025-06-04
Payer: MEDICARE

## 2025-06-04 DIAGNOSIS — G30.1 MODERATE LATE ONSET ALZHEIMER'S DEMENTIA WITH ANXIETY (MULTI): ICD-10-CM

## 2025-06-04 DIAGNOSIS — F43.9 REACTION TO SEVERE STRESS, UNSPECIFIED: ICD-10-CM

## 2025-06-04 DIAGNOSIS — I10 HTN (HYPERTENSION), BENIGN: ICD-10-CM

## 2025-06-04 DIAGNOSIS — F02.B4 MODERATE LATE ONSET ALZHEIMER'S DEMENTIA WITH ANXIETY (MULTI): ICD-10-CM

## 2025-06-04 RX ORDER — QUETIAPINE FUMARATE 25 MG/1
12.5 TABLET, FILM COATED ORAL NIGHTLY
Qty: 45 TABLET | Refills: 1 | Status: SHIPPED | OUTPATIENT
Start: 2025-06-04

## 2025-06-04 NOTE — PROGRESS NOTES
Care Management Monthly Outreach  Chart review completed  Confirmation of at least 2 patient identifiers  Change in insurance? No    Has patient been to ER/Urgent Care since last outreach? No    Last Office Visit with PCP: 5/12/2025   Next Office Visit with PCP: 9/15/2025   APC Collaboration: n/a    Chronic Conditions and Outreach Summary:   HTN (hypertension), benign    Moderate late onset Alzheimer's dementia with anxiety (Multi)    Spoke with pt's son Cameron. He has not been checking pt's BP at home on a regular basis but states he doesn't think she is having any problems. Pt's BP was elevated at her 5/12/25 PCP appt but she had not had her medications that morning. Cameron says pt is taking her medications as she is supposed to without incident but he simply forgot to give them to her that day before the appointment. Cameron denies signs of hyper or hypotension.     No report of falls since last outreach.    Dr. Giraldo suggested Cameron start to give pt the full 25 mg of Seroquel in the evening at her last visit. He wanted a trial run for a few days with report of outcome to determine if this would help pt develop better sleep routine. Cameron did not start this yet but says he will start it today and see how she does. Pt was up last night until about 1:30 am and she did go to bed when Cameron urged her to. Cameron says he does need a refill of Seroquel, will make Dr. Giraldo aware.     Pt is still eating and drinking well. Cameron has started buying less soda for pt.    Medications:   Are there medication changes since last visit? Yes -   Seroquel increased to 25 mg every evening to trial for a few days after 5/12/25 appt with Dr. Giraldo.   Refills needed? Yes -   Seroquel    Social Drivers of Health: Addressed in the last 6 months  Care Gaps Addressed? Addressed in the last 6 months  Care Plan addressed: Yes    Upcoming Appointments:   Future Appointments       Date / Time Provider Department Dept Phone    9/15/2025 1:00 PM  "(Arrive by 12:45 PM) Duglas Giraldo DO [facility] [phone]          Blood Pressures Reviewed  BP Readings from Last 3 Encounters:   05/12/25 142/82   01/16/25 122/64   01/10/25 110/58     Labs Reviewed:  Lab Results   Component Value Date    CREATININE 1.18 (H) 10/22/2022    GLUCOSE 116 (H) 10/22/2022    ALKPHOS 54 10/22/2022    K 4.0 10/22/2022    PROT 7.1 10/22/2022     10/22/2022    CALCIUM 9.0 10/22/2022    AST 22 10/22/2022    ALT 11 10/22/2022    BUN 10 10/22/2022    GFRF 45 (A) 10/22/2022     Lab Results   Component Value Date    TRIG 130 02/26/2022    CHOL 210 (H) 02/26/2022    HDL 77.0 02/26/2022     No results found for: \"HGBA1C\"  Lab Results   Component Value Date    WBC 3.8 (L) 10/22/2022    RBC 4.24 10/22/2022    HGB 13.5 10/22/2022     10/22/2022   No other concerns at this time.  Agreeable to continue monthly outreaches.  Encouraged to call if questions or concerns arise.    Milka Rivera RN  Chronic Care Manager  [phone]    "

## 2025-06-23 DIAGNOSIS — E03.9 ACQUIRED HYPOTHYROIDISM: ICD-10-CM

## 2025-06-23 DIAGNOSIS — F43.9 REACTION TO SEVERE STRESS, UNSPECIFIED: ICD-10-CM

## 2025-06-23 DIAGNOSIS — I10 ESSENTIAL (PRIMARY) HYPERTENSION: ICD-10-CM

## 2025-06-23 RX ORDER — CITALOPRAM 20 MG/1
20 TABLET ORAL DAILY
Qty: 90 TABLET | Refills: 1 | Status: SHIPPED | OUTPATIENT
Start: 2025-06-23

## 2025-06-23 RX ORDER — ISOSORBIDE DINITRATE 10 MG/1
10 TABLET ORAL DAILY
Qty: 90 TABLET | Refills: 1 | Status: SHIPPED | OUTPATIENT
Start: 2025-06-23

## 2025-06-23 RX ORDER — LEVOTHYROXINE SODIUM 88 UG/1
88 TABLET ORAL
Qty: 90 TABLET | Refills: 1 | Status: SHIPPED | OUTPATIENT
Start: 2025-06-23

## 2025-06-23 NOTE — TELEPHONE ENCOUNTER
Recent Visits  Date Type Provider Dept   05/12/25 Office Visit Duglas Giraldo, DO [facility]   Showing recent visits within past 90 days and meeting all other requirements  Future Appointments  Date Type Provider Dept   09/15/25 Appointment Duglas Giraldo DO [facility]   Showing future appointments within next 90 days and meeting all other requirements

## 2025-07-03 ENCOUNTER — PATIENT OUTREACH (OUTPATIENT)
Dept: PRIMARY CARE | Facility: CLINIC | Age: OVER 89
End: 2025-07-03
Payer: MEDICARE

## 2025-07-03 DIAGNOSIS — I10 ESSENTIAL (PRIMARY) HYPERTENSION: ICD-10-CM

## 2025-07-03 DIAGNOSIS — G30.1 MODERATE LATE ONSET ALZHEIMER'S DEMENTIA WITH ANXIETY (MULTI): ICD-10-CM

## 2025-07-03 DIAGNOSIS — F02.B4 MODERATE LATE ONSET ALZHEIMER'S DEMENTIA WITH ANXIETY (MULTI): ICD-10-CM

## 2025-07-03 DIAGNOSIS — R41.3 MEMORY CHANGES: ICD-10-CM

## 2025-07-03 SDOH — ECONOMIC STABILITY: INCOME INSECURITY: IN THE LAST 12 MONTHS, WAS THERE A TIME WHEN YOU WERE NOT ABLE TO PAY THE MORTGAGE OR RENT ON TIME?: NO

## 2025-07-03 ASSESSMENT — LIFESTYLE VARIABLES
AUDIT-C TOTAL SCORE: 4
HOW MANY STANDARD DRINKS CONTAINING ALCOHOL DO YOU HAVE ON A TYPICAL DAY: 1 OR 2
HOW OFTEN DO YOU HAVE A DRINK CONTAINING ALCOHOL: 4 OR MORE TIMES A WEEK
SKIP TO QUESTIONS 9-10: 1
HOW OFTEN DO YOU HAVE SIX OR MORE DRINKS ON ONE OCCASION: NEVER

## 2025-07-03 ASSESSMENT — SOCIAL DETERMINANTS OF HEALTH (SDOH)

## 2025-07-03 NOTE — PROGRESS NOTES
Care Management Monthly Outreach  Chart review completed  Confirmation of at least 2 patient identifiers  Change in insurance? No    Has patient been to ER/Urgent Care since last outreach? No    Last Office Visit with PCP: 5/12/2025   Next Office Visit with PCP: 9/15/2025   APC Collaboration: n/a    Chronic Conditions and Outreach Summary:   Essential (primary) hypertension    Moderate late onset Alzheimer's dementia with anxiety (Multi)    Memory changes    Spoke with pt's son and primary caregiver Cameron.     Pt is doing good, no concerns. Cameron does not take pt's BP at home every day but when he does take it, it's unremarkable. Pt is not complaining of headache, blurry vision, dizziness or lightheadedness. Pt has not sustained any falls.    Pt is taking all of her medications as ordered. Cameron gets them ready for her gives them to her each morning. Pt's appetite is good and she is drinking plenty of fluids. Pt still enjoys 1 beer per day in the evening.     At May visit, discussion was had about increasing pt's Seroquel to 25 mg. Cameron has given her the 25 mg on a few occasions and says he didn't really notice a difference so he has kept her on the 12.5 mg. Per Cameron, no interest in increasing the dose at this time.    Medications:   Are there medication changes since last visit? No  Refills needed? No    Social Drivers of Health: Addressed today  Care Gaps Addressed? Deferred  Care Plan addressed: Yes    Upcoming Appointments:   Future Appointments       Date / Time Provider Department Dept Phone    9/15/2025 1:00 PM (Arrive by 12:45 PM) Duglas Giraldo DO [facility] [phone]          Blood Pressures Reviewed  BP Readings from Last 3 Encounters:   05/12/25 142/82   01/16/25 122/64   01/10/25 110/58     Labs Reviewed:  Lab Results   Component Value Date    CREATININE 1.18 (H) 10/22/2022    GLUCOSE 116 (H) 10/22/2022    ALKPHOS 54 10/22/2022    K 4.0 10/22/2022    PROT 7.1 10/22/2022      "10/22/2022    CALCIUM 9.0 10/22/2022    AST 22 10/22/2022    ALT 11 10/22/2022    BUN 10 10/22/2022    GFRF 45 (A) 10/22/2022     Lab Results   Component Value Date    TRIG 130 02/26/2022    CHOL 210 (H) 02/26/2022    HDL 77.0 02/26/2022     No results found for: \"HGBA1C\"  Lab Results   Component Value Date    WBC 3.8 (L) 10/22/2022    RBC 4.24 10/22/2022    HGB 13.5 10/22/2022     10/22/2022   No other concerns at this time.  Agreeable to continue monthly outreaches.  Encouraged to call if questions or concerns arise.    Milka Rivera RN  Chronic Care Manager  [phone]    "

## 2025-08-04 ENCOUNTER — PATIENT OUTREACH (OUTPATIENT)
Dept: PRIMARY CARE | Facility: CLINIC | Age: OVER 89
End: 2025-08-04
Payer: MEDICARE

## 2025-08-04 DIAGNOSIS — F02.B4 MODERATE LATE ONSET ALZHEIMER'S DEMENTIA WITH ANXIETY (MULTI): ICD-10-CM

## 2025-08-04 DIAGNOSIS — G30.1 MODERATE LATE ONSET ALZHEIMER'S DEMENTIA WITH ANXIETY (MULTI): ICD-10-CM

## 2025-08-04 DIAGNOSIS — I10 HTN (HYPERTENSION), BENIGN: ICD-10-CM

## 2025-08-04 NOTE — PROGRESS NOTES
Phone call to pt's son Cameron for monthly outreach. Cameron is camping, asked this RN CM to call back on Friday, 8/8/25.

## 2025-08-08 ENCOUNTER — PATIENT OUTREACH (OUTPATIENT)
Dept: PRIMARY CARE | Facility: CLINIC | Age: OVER 89
End: 2025-08-08
Payer: MEDICARE

## 2025-08-08 DIAGNOSIS — R63.0 ANOREXIA: ICD-10-CM

## 2025-08-08 DIAGNOSIS — G30.1 MODERATE LATE ONSET ALZHEIMER'S DEMENTIA WITH ANXIETY (MULTI): ICD-10-CM

## 2025-08-08 DIAGNOSIS — F02.B4 MODERATE LATE ONSET ALZHEIMER'S DEMENTIA WITH ANXIETY (MULTI): ICD-10-CM

## 2025-08-08 NOTE — PROGRESS NOTES
Care Management Monthly Outreach  Chart review completed  Confirmation of at least 2 patient identifiers  Change in insurance? No    Has patient been to ER/Urgent Care since last outreach? No    Last Office Visit with PCP: 5/12/2025   Next Office Visit with PCP: 9/15/2025   APC Collaboration: n/a    Chronic Conditions and Outreach Summary:   Moderate late onset Alzheimer's dementia with anxiety (Multi)    Anorexia    Spoke with pt's son and primary caregiver, Cameron.     Pt appears to be doing okay. Pt has not sustained any falls and continues to cooperate with taking her medications.    Cameron feels pt may have lost a little weight but her clothes are still fitting well and she looks well to him. Pt's appetite is fair and she still enjoys an occasional beer.     Pt has an upcoming appointment with Dr. Giraldo on 9/15/25. Reviewed labs ordered. Cameron will take pt to have her blood work done and encourage her to stay out and go to breakfast. Pt does not like to leave the house but per Cameron, once she is out, she is happy to be out.     Cameron was able to go camping for a few days this week and leave pt with his sister. He enjoyed his time away.    Labs printed and sent to pt's home via [employer].    Medications:   Are there medication changes since last visit? No  Refills needed? No    Social Drivers of Health: Addressed in the last 6 months  Care Gaps Addressed? Deferred  Care Plan addressed: Yes    Upcoming Appointments:   Future Appointments       Date / Time Provider Department Dept Phone    9/15/2025 1:00 PM (Arrive by 12:45 PM) Duglas Giraldo,  [facility] [phone]          Blood Pressures Reviewed  BP Readings from Last 3 Encounters:   05/12/25 142/82   01/16/25 122/64   01/10/25 110/58     Labs Reviewed:  Lab Results   Component Value Date    CREATININE 1.18 (H) 10/22/2022    GLUCOSE 116 (H) 10/22/2022    ALKPHOS 54 10/22/2022    K 4.0 10/22/2022    PROT 7.1 10/22/2022     10/22/2022     "CALCIUM 9.0 10/22/2022    AST 22 10/22/2022    ALT 11 10/22/2022    BUN 10 10/22/2022    GFRF 45 (A) 10/22/2022     Lab Results   Component Value Date    TRIG 130 02/26/2022    CHOL 210 (H) 02/26/2022    HDL 77.0 02/26/2022     No results found for: \"HGBA1C\"  Lab Results   Component Value Date    WBC 3.8 (L) 10/22/2022    RBC 4.24 10/22/2022    HGB 13.5 10/22/2022     10/22/2022   No other concerns at this time.  Agreeable to continue monthly outreaches.  Encouraged to call if questions or concerns arise.    Milka Rivera RN  Chronic Care Manager  [phone]    "

## 2025-09-03 LAB
25(OH)D3+25(OH)D2 SERPL-MCNC: 22 NG/ML (ref 30–100)
ALBUMIN SERPL-MCNC: 4 G/DL (ref 3.6–5.1)
ALP SERPL-CCNC: 81 U/L (ref 37–153)
ALT SERPL-CCNC: 6 U/L (ref 6–29)
ANION GAP SERPL CALCULATED.4IONS-SCNC: 8 MMOL/L (CALC) (ref 7–17)
AST SERPL-CCNC: 12 U/L (ref 10–35)
BASOPHILS # BLD AUTO: 40 CELLS/UL (ref 0–200)
BASOPHILS NFR BLD AUTO: 0.8 %
BILIRUB SERPL-MCNC: 0.7 MG/DL (ref 0.2–1.2)
BUN SERPL-MCNC: 10 MG/DL (ref 7–25)
CALCIUM SERPL-MCNC: 9.2 MG/DL (ref 8.6–10.4)
CHLORIDE SERPL-SCNC: 101 MMOL/L (ref 98–110)
CHOLEST SERPL-MCNC: 231 MG/DL
CHOLEST/HDLC SERPL: 3 (CALC)
CO2 SERPL-SCNC: 27 MMOL/L (ref 20–32)
CREAT SERPL-MCNC: 1.08 MG/DL (ref 0.6–0.95)
EGFRCR SERPLBLD CKD-EPI 2021: 49 ML/MIN/1.73M2
EOSINOPHIL # BLD AUTO: 350 CELLS/UL (ref 15–500)
EOSINOPHIL NFR BLD AUTO: 7 %
ERYTHROCYTE [DISTWIDTH] IN BLOOD BY AUTOMATED COUNT: 13 % (ref 11–15)
ERYTHROCYTE [SEDIMENTATION RATE] IN BLOOD BY WESTERGREN METHOD: 17 MM/H
GLUCOSE SERPL-MCNC: 96 MG/DL (ref 65–99)
HCT VFR BLD AUTO: 40 % (ref 35–45)
HDLC SERPL-MCNC: 77 MG/DL
HGB BLD-MCNC: 13 G/DL (ref 11.7–15.5)
LDLC SERPL CALC-MCNC: 129 MG/DL (CALC)
LYMPHOCYTES # BLD AUTO: 1150 CELLS/UL (ref 850–3900)
LYMPHOCYTES NFR BLD AUTO: 23 %
MCH RBC QN AUTO: 31.1 PG (ref 27–33)
MCHC RBC AUTO-ENTMCNC: 32.5 G/DL (ref 32–36)
MCV RBC AUTO: 95.7 FL (ref 80–100)
MONOCYTES # BLD AUTO: 400 CELLS/UL (ref 200–950)
MONOCYTES NFR BLD AUTO: 8 %
NEUTROPHILS # BLD AUTO: 3060 CELLS/UL (ref 1500–7800)
NEUTROPHILS NFR BLD AUTO: 61.2 %
NONHDLC SERPL-MCNC: 154 MG/DL (CALC)
PLATELET # BLD AUTO: 220 THOUSAND/UL (ref 140–400)
PMV BLD REES-ECKER: 11.2 FL (ref 7.5–12.5)
POTASSIUM SERPL-SCNC: 4.9 MMOL/L (ref 3.5–5.3)
PROT SERPL-MCNC: 7.5 G/DL (ref 6.1–8.1)
RBC # BLD AUTO: 4.18 MILLION/UL (ref 3.8–5.1)
SODIUM SERPL-SCNC: 136 MMOL/L (ref 135–146)
TRIGL SERPL-MCNC: 131 MG/DL
TSH SERPL-ACNC: 2.11 MIU/L (ref 0.4–4.5)
WBC # BLD AUTO: 5 THOUSAND/UL (ref 3.8–10.8)

## 2025-09-15 ENCOUNTER — APPOINTMENT (OUTPATIENT)
Dept: PRIMARY CARE | Facility: CLINIC | Age: OVER 89
End: 2025-09-15
Payer: MEDICARE